# Patient Record
Sex: MALE | Race: WHITE | NOT HISPANIC OR LATINO | Employment: FULL TIME | URBAN - METROPOLITAN AREA
[De-identification: names, ages, dates, MRNs, and addresses within clinical notes are randomized per-mention and may not be internally consistent; named-entity substitution may affect disease eponyms.]

---

## 2017-05-31 ENCOUNTER — COMMUNICATION - HEALTHEAST (OUTPATIENT)
Dept: SCHEDULING | Facility: CLINIC | Age: 57
End: 2017-05-31

## 2017-05-31 ENCOUNTER — OFFICE VISIT - HEALTHEAST (OUTPATIENT)
Dept: FAMILY MEDICINE | Facility: CLINIC | Age: 57
End: 2017-05-31

## 2017-05-31 DIAGNOSIS — S61.011A THUMB LACERATION, RIGHT, INITIAL ENCOUNTER: ICD-10-CM

## 2017-07-05 ENCOUNTER — OFFICE VISIT - HEALTHEAST (OUTPATIENT)
Dept: FAMILY MEDICINE | Facility: CLINIC | Age: 57
End: 2017-07-05

## 2017-07-05 DIAGNOSIS — K42.9 UMBILICAL HERNIA WITHOUT OBSTRUCTION AND WITHOUT GANGRENE: ICD-10-CM

## 2017-07-05 DIAGNOSIS — K40.21: ICD-10-CM

## 2017-07-05 DIAGNOSIS — E66.09 NON MORBID OBESITY DUE TO EXCESS CALORIES: ICD-10-CM

## 2017-07-05 DIAGNOSIS — F30.9 BIPOLAR I DISORDER, SINGLE MANIC EPISODE (H): ICD-10-CM

## 2017-07-05 DIAGNOSIS — M1A.00X0 CHRONIC GOUTY ARTHROPATHY: ICD-10-CM

## 2017-07-05 DIAGNOSIS — Z00.00 ROUTINE GENERAL MEDICAL EXAMINATION AT A HEALTH CARE FACILITY: ICD-10-CM

## 2017-07-05 DIAGNOSIS — E78.00 HYPERCHOLESTEROLEMIA: ICD-10-CM

## 2017-07-05 DIAGNOSIS — Z91.09 OTHER ALLERGY, OTHER THAN TO MEDICINAL AGENTS: ICD-10-CM

## 2017-07-05 LAB
CHOLEST SERPL-MCNC: 256 MG/DL
FASTING STATUS PATIENT QL REPORTED: YES
HDLC SERPL-MCNC: 43 MG/DL
LDLC SERPL CALC-MCNC: 182 MG/DL
TRIGL SERPL-MCNC: 155 MG/DL

## 2017-07-05 ASSESSMENT — MIFFLIN-ST. JEOR: SCORE: 1766.08

## 2017-07-26 ENCOUNTER — COMMUNICATION - HEALTHEAST (OUTPATIENT)
Dept: FAMILY MEDICINE | Facility: CLINIC | Age: 57
End: 2017-07-26

## 2017-07-26 DIAGNOSIS — D23.9 DYSPLASTIC NEVI: ICD-10-CM

## 2017-08-01 ENCOUNTER — OFFICE VISIT - HEALTHEAST (OUTPATIENT)
Dept: RHEUMATOLOGY | Facility: CLINIC | Age: 57
End: 2017-08-01

## 2017-08-01 DIAGNOSIS — M1A.00X0 CHRONIC GOUTY ARTHROPATHY: ICD-10-CM

## 2017-08-01 ASSESSMENT — MIFFLIN-ST. JEOR: SCORE: 1798.74

## 2018-01-31 ENCOUNTER — COMMUNICATION - HEALTHEAST (OUTPATIENT)
Dept: RHEUMATOLOGY | Facility: CLINIC | Age: 58
End: 2018-01-31

## 2018-01-31 DIAGNOSIS — M1A.00X0 CHRONIC GOUTY ARTHROPATHY: ICD-10-CM

## 2018-03-26 ENCOUNTER — OFFICE VISIT - HEALTHEAST (OUTPATIENT)
Dept: RHEUMATOLOGY | Facility: CLINIC | Age: 58
End: 2018-03-26

## 2018-03-26 DIAGNOSIS — M10.021 ACUTE IDIOPATHIC GOUT OF RIGHT ELBOW: ICD-10-CM

## 2018-03-26 DIAGNOSIS — M1A.00X0 CHRONIC GOUTY ARTHROPATHY: ICD-10-CM

## 2018-03-26 ASSESSMENT — MIFFLIN-ST. JEOR: SCORE: 1798.74

## 2018-04-23 ENCOUNTER — RECORDS - HEALTHEAST (OUTPATIENT)
Dept: GENERAL RADIOLOGY | Facility: CLINIC | Age: 58
End: 2018-04-23

## 2018-04-23 ENCOUNTER — COMMUNICATION - HEALTHEAST (OUTPATIENT)
Dept: ADMINISTRATIVE | Facility: CLINIC | Age: 58
End: 2018-04-23

## 2018-04-23 ENCOUNTER — OFFICE VISIT - HEALTHEAST (OUTPATIENT)
Dept: FAMILY MEDICINE | Facility: CLINIC | Age: 58
End: 2018-04-23

## 2018-04-23 DIAGNOSIS — M1A.00X0 CHRONIC GOUTY ARTHROPATHY: ICD-10-CM

## 2018-04-23 DIAGNOSIS — M25.521 PAIN IN RIGHT ELBOW: ICD-10-CM

## 2018-04-23 DIAGNOSIS — M25.521 RIGHT ELBOW PAIN: ICD-10-CM

## 2018-04-23 ASSESSMENT — MIFFLIN-ST. JEOR: SCORE: 1854.08

## 2018-04-27 ENCOUNTER — OFFICE VISIT - HEALTHEAST (OUTPATIENT)
Dept: RHEUMATOLOGY | Facility: CLINIC | Age: 58
End: 2018-04-27

## 2018-04-27 DIAGNOSIS — M10.021 ACUTE IDIOPATHIC GOUT OF RIGHT ELBOW: ICD-10-CM

## 2018-04-27 LAB — CRYSTALS SNV MICRO: NORMAL

## 2018-05-04 ENCOUNTER — COMMUNICATION - HEALTHEAST (OUTPATIENT)
Dept: ADMINISTRATIVE | Facility: CLINIC | Age: 58
End: 2018-05-04

## 2018-06-28 ENCOUNTER — OFFICE VISIT - HEALTHEAST (OUTPATIENT)
Dept: RHEUMATOLOGY | Facility: CLINIC | Age: 58
End: 2018-06-28

## 2018-06-28 DIAGNOSIS — M10.021 ACUTE IDIOPATHIC GOUT OF RIGHT ELBOW: ICD-10-CM

## 2018-08-05 ENCOUNTER — COMMUNICATION - HEALTHEAST (OUTPATIENT)
Dept: RHEUMATOLOGY | Facility: CLINIC | Age: 58
End: 2018-08-05

## 2018-08-09 ENCOUNTER — AMBULATORY - HEALTHEAST (OUTPATIENT)
Dept: RHEUMATOLOGY | Facility: CLINIC | Age: 58
End: 2018-08-09

## 2018-08-09 DIAGNOSIS — M10.021 ACUTE IDIOPATHIC GOUT OF RIGHT ELBOW: ICD-10-CM

## 2018-08-24 ENCOUNTER — AMBULATORY - HEALTHEAST (OUTPATIENT)
Dept: LAB | Facility: CLINIC | Age: 58
End: 2018-08-24

## 2018-08-24 DIAGNOSIS — M10.021 ACUTE IDIOPATHIC GOUT OF RIGHT ELBOW: ICD-10-CM

## 2018-08-24 LAB
ALBUMIN SERPL-MCNC: 4.5 G/DL (ref 3.5–5)
ALT SERPL W P-5'-P-CCNC: 45 U/L (ref 0–45)
CREAT SERPL-MCNC: 0.78 MG/DL (ref 0.7–1.3)
ERYTHROCYTE [DISTWIDTH] IN BLOOD BY AUTOMATED COUNT: 11 % (ref 11–14.5)
GFR SERPL CREATININE-BSD FRML MDRD: >60 ML/MIN/1.73M2
HCT VFR BLD AUTO: 48.4 % (ref 40–54)
HGB BLD-MCNC: 16.6 G/DL (ref 14–18)
MCH RBC QN AUTO: 31.4 PG (ref 27–34)
MCHC RBC AUTO-ENTMCNC: 34.4 G/DL (ref 32–36)
MCV RBC AUTO: 91 FL (ref 80–100)
PLATELET # BLD AUTO: 243 THOU/UL (ref 140–440)
PMV BLD AUTO: 8.1 FL (ref 7–10)
RBC # BLD AUTO: 5.29 MILL/UL (ref 4.4–6.2)
URATE SERPL-MCNC: 6.9 MG/DL (ref 3–8)
WBC: 7 THOU/UL (ref 4–11)

## 2018-08-28 ENCOUNTER — OFFICE VISIT - HEALTHEAST (OUTPATIENT)
Dept: RHEUMATOLOGY | Facility: CLINIC | Age: 58
End: 2018-08-28

## 2018-08-28 DIAGNOSIS — M1A.00X0 CHRONIC GOUTY ARTHROPATHY: ICD-10-CM

## 2018-11-09 ENCOUNTER — COMMUNICATION - HEALTHEAST (OUTPATIENT)
Dept: LAB | Facility: CLINIC | Age: 58
End: 2018-11-09

## 2018-11-09 DIAGNOSIS — M1A.00X0 CHRONIC GOUTY ARTHROPATHY: ICD-10-CM

## 2018-11-23 ENCOUNTER — AMBULATORY - HEALTHEAST (OUTPATIENT)
Dept: LAB | Facility: CLINIC | Age: 58
End: 2018-11-23

## 2018-11-23 DIAGNOSIS — M1A.00X0 CHRONIC GOUTY ARTHROPATHY: ICD-10-CM

## 2018-11-23 LAB
ALBUMIN SERPL-MCNC: 4 G/DL (ref 3.5–5)
ALT SERPL W P-5'-P-CCNC: 64 U/L (ref 0–45)
CREAT SERPL-MCNC: 0.74 MG/DL (ref 0.7–1.3)
ERYTHROCYTE [DISTWIDTH] IN BLOOD BY AUTOMATED COUNT: 11.6 % (ref 11–14.5)
GFR SERPL CREATININE-BSD FRML MDRD: >60 ML/MIN/1.73M2
HCT VFR BLD AUTO: 45.7 % (ref 40–54)
HGB BLD-MCNC: 16.1 G/DL (ref 14–18)
MCH RBC QN AUTO: 32.4 PG (ref 27–34)
MCHC RBC AUTO-ENTMCNC: 35.3 G/DL (ref 32–36)
MCV RBC AUTO: 92 FL (ref 80–100)
PLATELET # BLD AUTO: 227 THOU/UL (ref 140–440)
PMV BLD AUTO: 7.9 FL (ref 7–10)
RBC # BLD AUTO: 4.97 MILL/UL (ref 4.4–6.2)
URATE SERPL-MCNC: 5.1 MG/DL (ref 3–8)
WBC: 8.8 THOU/UL (ref 4–11)

## 2018-11-27 ENCOUNTER — OFFICE VISIT - HEALTHEAST (OUTPATIENT)
Dept: RHEUMATOLOGY | Facility: CLINIC | Age: 58
End: 2018-11-27

## 2018-11-27 DIAGNOSIS — M1A.00X0 CHRONIC GOUTY ARTHROPATHY: ICD-10-CM

## 2018-11-27 DIAGNOSIS — R74.01 ALT (SGPT) LEVEL RAISED: ICD-10-CM

## 2018-12-13 ENCOUNTER — COMMUNICATION - HEALTHEAST (OUTPATIENT)
Dept: LAB | Facility: CLINIC | Age: 58
End: 2018-12-13

## 2018-12-13 DIAGNOSIS — R74.01 ELEVATED ALT MEASUREMENT: ICD-10-CM

## 2018-12-13 DIAGNOSIS — M1A.00X0 CHRONIC GOUTY ARTHROPATHY: ICD-10-CM

## 2018-12-27 ENCOUNTER — AMBULATORY - HEALTHEAST (OUTPATIENT)
Dept: LAB | Facility: CLINIC | Age: 58
End: 2018-12-27

## 2018-12-27 DIAGNOSIS — R74.01 ELEVATED ALT MEASUREMENT: ICD-10-CM

## 2018-12-27 LAB — ALT SERPL W P-5'-P-CCNC: 64 U/L (ref 0–45)

## 2019-02-08 ENCOUNTER — AMBULATORY - HEALTHEAST (OUTPATIENT)
Dept: RHEUMATOLOGY | Facility: CLINIC | Age: 59
End: 2019-02-08

## 2019-02-08 DIAGNOSIS — M10.021 ACUTE IDIOPATHIC GOUT OF RIGHT ELBOW: ICD-10-CM

## 2019-02-08 DIAGNOSIS — M1A.00X0 CHRONIC GOUTY ARTHROPATHY: ICD-10-CM

## 2019-02-27 ENCOUNTER — AMBULATORY - HEALTHEAST (OUTPATIENT)
Dept: LAB | Facility: CLINIC | Age: 59
End: 2019-02-27

## 2019-02-27 DIAGNOSIS — M1A.00X0 CHRONIC GOUTY ARTHROPATHY: ICD-10-CM

## 2019-02-27 LAB
ALBUMIN SERPL-MCNC: 4.1 G/DL (ref 3.5–5)
ALT SERPL W P-5'-P-CCNC: 46 U/L (ref 0–45)
CREAT SERPL-MCNC: 0.84 MG/DL (ref 0.7–1.3)
ERYTHROCYTE [DISTWIDTH] IN BLOOD BY AUTOMATED COUNT: 11.9 % (ref 11–14.5)
GFR SERPL CREATININE-BSD FRML MDRD: >60 ML/MIN/1.73M2
HCT VFR BLD AUTO: 46.8 % (ref 40–54)
HGB BLD-MCNC: 15.9 G/DL (ref 14–18)
MCH RBC QN AUTO: 31.1 PG (ref 27–34)
MCHC RBC AUTO-ENTMCNC: 33.8 G/DL (ref 32–36)
MCV RBC AUTO: 92 FL (ref 80–100)
PLATELET # BLD AUTO: 223 THOU/UL (ref 140–440)
PMV BLD AUTO: 8.1 FL (ref 7–10)
RBC # BLD AUTO: 5.1 MILL/UL (ref 4.4–6.2)
URATE SERPL-MCNC: 6.1 MG/DL (ref 3–8)
WBC: 6.2 THOU/UL (ref 4–11)

## 2019-03-12 ENCOUNTER — OFFICE VISIT - HEALTHEAST (OUTPATIENT)
Dept: RHEUMATOLOGY | Facility: CLINIC | Age: 59
End: 2019-03-12

## 2019-03-12 DIAGNOSIS — M1A.00X0 CHRONIC GOUTY ARTHROPATHY: ICD-10-CM

## 2019-03-12 DIAGNOSIS — R74.01 ALT (SGPT) LEVEL RAISED: ICD-10-CM

## 2019-03-18 ENCOUNTER — COMMUNICATION - HEALTHEAST (OUTPATIENT)
Dept: RHEUMATOLOGY | Facility: CLINIC | Age: 59
End: 2019-03-18

## 2019-03-18 DIAGNOSIS — M1A.00X0 CHRONIC GOUTY ARTHROPATHY: ICD-10-CM

## 2019-05-31 ENCOUNTER — COMMUNICATION - HEALTHEAST (OUTPATIENT)
Dept: LAB | Facility: CLINIC | Age: 59
End: 2019-05-31

## 2019-05-31 DIAGNOSIS — M1A.00X0 CHRONIC GOUTY ARTHROPATHY: ICD-10-CM

## 2019-06-12 ENCOUNTER — AMBULATORY - HEALTHEAST (OUTPATIENT)
Dept: LAB | Facility: CLINIC | Age: 59
End: 2019-06-12

## 2019-06-12 DIAGNOSIS — M1A.00X0 CHRONIC GOUTY ARTHROPATHY: ICD-10-CM

## 2019-06-12 LAB
ALBUMIN SERPL-MCNC: 3.8 G/DL (ref 3.5–5)
ALT SERPL W P-5'-P-CCNC: 34 U/L (ref 0–45)
CREAT SERPL-MCNC: 0.8 MG/DL (ref 0.7–1.3)
ERYTHROCYTE [DISTWIDTH] IN BLOOD BY AUTOMATED COUNT: 12.1 % (ref 11–14.5)
GFR SERPL CREATININE-BSD FRML MDRD: >60 ML/MIN/1.73M2
HCT VFR BLD AUTO: 45.6 % (ref 40–54)
HGB BLD-MCNC: 15.1 G/DL (ref 14–18)
MCH RBC QN AUTO: 30.4 PG (ref 27–34)
MCHC RBC AUTO-ENTMCNC: 33.1 G/DL (ref 32–36)
MCV RBC AUTO: 92 FL (ref 80–100)
PLATELET # BLD AUTO: 218 THOU/UL (ref 140–440)
PMV BLD AUTO: 8.2 FL (ref 7–10)
RBC # BLD AUTO: 4.97 MILL/UL (ref 4.4–6.2)
URATE SERPL-MCNC: 5.3 MG/DL (ref 3–8)
WBC: 4.8 THOU/UL (ref 4–11)

## 2019-07-05 ENCOUNTER — COMMUNICATION - HEALTHEAST (OUTPATIENT)
Dept: RHEUMATOLOGY | Facility: CLINIC | Age: 59
End: 2019-07-05

## 2019-07-05 DIAGNOSIS — M1A.00X0 CHRONIC GOUTY ARTHROPATHY: ICD-10-CM

## 2019-07-24 ENCOUNTER — COMMUNICATION - HEALTHEAST (OUTPATIENT)
Dept: RHEUMATOLOGY | Facility: CLINIC | Age: 59
End: 2019-07-24

## 2019-07-24 DIAGNOSIS — M1A.00X0 CHRONIC GOUTY ARTHROPATHY: ICD-10-CM

## 2019-08-01 ENCOUNTER — RECORDS - HEALTHEAST (OUTPATIENT)
Dept: ADMINISTRATIVE | Facility: OTHER | Age: 59
End: 2019-08-01

## 2019-08-09 ENCOUNTER — COMMUNICATION - HEALTHEAST (OUTPATIENT)
Dept: RHEUMATOLOGY | Facility: CLINIC | Age: 59
End: 2019-08-09

## 2019-08-09 DIAGNOSIS — M1A.00X0 CHRONIC GOUTY ARTHROPATHY: ICD-10-CM

## 2019-09-09 ENCOUNTER — AMBULATORY - HEALTHEAST (OUTPATIENT)
Dept: LAB | Facility: CLINIC | Age: 59
End: 2019-09-09

## 2019-09-09 DIAGNOSIS — M1A.00X0 CHRONIC GOUTY ARTHROPATHY: ICD-10-CM

## 2019-09-09 LAB
ALBUMIN SERPL-MCNC: 4.2 G/DL (ref 3.5–5)
ALT SERPL W P-5'-P-CCNC: 43 U/L (ref 0–45)
CREAT SERPL-MCNC: 0.86 MG/DL (ref 0.7–1.3)
ERYTHROCYTE [DISTWIDTH] IN BLOOD BY AUTOMATED COUNT: 12 % (ref 11–14.5)
GFR SERPL CREATININE-BSD FRML MDRD: >60 ML/MIN/1.73M2
HCT VFR BLD AUTO: 45 % (ref 40–54)
HGB BLD-MCNC: 15.6 G/DL (ref 14–18)
MCH RBC QN AUTO: 31.5 PG (ref 27–34)
MCHC RBC AUTO-ENTMCNC: 34.6 G/DL (ref 32–36)
MCV RBC AUTO: 91 FL (ref 80–100)
PLATELET # BLD AUTO: 237 THOU/UL (ref 140–440)
PMV BLD AUTO: 7.7 FL (ref 7–10)
RBC # BLD AUTO: 4.94 MILL/UL (ref 4.4–6.2)
URATE SERPL-MCNC: 5.8 MG/DL (ref 3–8)
WBC: 6.2 THOU/UL (ref 4–11)

## 2019-09-12 ENCOUNTER — OFFICE VISIT - HEALTHEAST (OUTPATIENT)
Dept: RHEUMATOLOGY | Facility: CLINIC | Age: 59
End: 2019-09-12

## 2019-09-12 DIAGNOSIS — M1A.00X0 CHRONIC GOUTY ARTHROPATHY: ICD-10-CM

## 2019-09-12 DIAGNOSIS — M79.672 INFLAMMATORY HEEL PAIN, LEFT: ICD-10-CM

## 2019-12-03 ENCOUNTER — COMMUNICATION - HEALTHEAST (OUTPATIENT)
Dept: RHEUMATOLOGY | Facility: CLINIC | Age: 59
End: 2019-12-03

## 2019-12-16 ENCOUNTER — AMBULATORY - HEALTHEAST (OUTPATIENT)
Dept: LAB | Facility: CLINIC | Age: 59
End: 2019-12-16

## 2019-12-16 DIAGNOSIS — M1A.00X0 CHRONIC GOUTY ARTHROPATHY: ICD-10-CM

## 2019-12-16 LAB
ALT SERPL W P-5'-P-CCNC: 44 U/L (ref 0–45)
CREAT SERPL-MCNC: 0.82 MG/DL (ref 0.7–1.3)
ERYTHROCYTE [DISTWIDTH] IN BLOOD BY AUTOMATED COUNT: 11.8 % (ref 11–14.5)
GFR SERPL CREATININE-BSD FRML MDRD: >60 ML/MIN/1.73M2
HCT VFR BLD AUTO: 50.3 % (ref 40–54)
HGB BLD-MCNC: 16.4 G/DL (ref 14–18)
MCH RBC QN AUTO: 30.1 PG (ref 27–34)
MCHC RBC AUTO-ENTMCNC: 32.5 G/DL (ref 32–36)
MCV RBC AUTO: 93 FL (ref 80–100)
PLATELET # BLD AUTO: 232 THOU/UL (ref 140–440)
PMV BLD AUTO: 7.9 FL (ref 7–10)
RBC # BLD AUTO: 5.43 MILL/UL (ref 4.4–6.2)
URATE SERPL-MCNC: 4.5 MG/DL (ref 3–8)
WBC: 6.2 THOU/UL (ref 4–11)

## 2020-01-29 ENCOUNTER — OFFICE VISIT - HEALTHEAST (OUTPATIENT)
Dept: FAMILY MEDICINE | Facility: CLINIC | Age: 60
End: 2020-01-29

## 2020-01-29 DIAGNOSIS — F30.9 BIPOLAR I DISORDER, SINGLE MANIC EPISODE (H): ICD-10-CM

## 2020-01-29 DIAGNOSIS — K42.9 UMBILICAL HERNIA WITHOUT OBSTRUCTION AND WITHOUT GANGRENE: ICD-10-CM

## 2020-01-29 DIAGNOSIS — R73.01 IMPAIRED FASTING GLUCOSE: ICD-10-CM

## 2020-01-29 DIAGNOSIS — K40.20 NON-RECURRENT BILATERAL INGUINAL HERNIA WITHOUT OBSTRUCTION OR GANGRENE: ICD-10-CM

## 2020-01-29 DIAGNOSIS — E66.09 CLASS 2 OBESITY DUE TO EXCESS CALORIES WITHOUT SERIOUS COMORBIDITY WITH BODY MASS INDEX (BMI) OF 36.0 TO 36.9 IN ADULT: ICD-10-CM

## 2020-01-29 DIAGNOSIS — R74.01 ALT (SGPT) LEVEL RAISED: ICD-10-CM

## 2020-01-29 DIAGNOSIS — Z00.00 ROUTINE GENERAL MEDICAL EXAMINATION AT A HEALTH CARE FACILITY: ICD-10-CM

## 2020-01-29 DIAGNOSIS — E66.812 CLASS 2 OBESITY DUE TO EXCESS CALORIES WITHOUT SERIOUS COMORBIDITY WITH BODY MASS INDEX (BMI) OF 36.0 TO 36.9 IN ADULT: ICD-10-CM

## 2020-01-29 DIAGNOSIS — M1A.00X0 CHRONIC GOUTY ARTHROPATHY: ICD-10-CM

## 2020-01-29 DIAGNOSIS — E78.00 HYPERCHOLESTEROLEMIA: ICD-10-CM

## 2020-01-29 ASSESSMENT — MIFFLIN-ST. JEOR: SCORE: 1909.41

## 2020-03-09 ENCOUNTER — AMBULATORY - HEALTHEAST (OUTPATIENT)
Dept: LAB | Facility: CLINIC | Age: 60
End: 2020-03-09

## 2020-03-09 DIAGNOSIS — R73.01 IMPAIRED FASTING GLUCOSE: ICD-10-CM

## 2020-03-09 DIAGNOSIS — Z00.00 ROUTINE GENERAL MEDICAL EXAMINATION AT A HEALTH CARE FACILITY: ICD-10-CM

## 2020-03-09 DIAGNOSIS — M1A.00X0 CHRONIC GOUTY ARTHROPATHY: ICD-10-CM

## 2020-03-09 DIAGNOSIS — E78.00 HYPERCHOLESTEROLEMIA: ICD-10-CM

## 2020-03-09 DIAGNOSIS — R74.01 ALT (SGPT) LEVEL RAISED: ICD-10-CM

## 2020-03-09 LAB
ALBUMIN SERPL-MCNC: 3.8 G/DL (ref 3.5–5)
ALP SERPL-CCNC: 79 U/L (ref 45–120)
ALT SERPL W P-5'-P-CCNC: 37 U/L (ref 0–45)
ANION GAP SERPL CALCULATED.3IONS-SCNC: 11 MMOL/L (ref 5–18)
AST SERPL W P-5'-P-CCNC: 19 U/L (ref 0–40)
BILIRUB SERPL-MCNC: 0.6 MG/DL (ref 0–1)
BUN SERPL-MCNC: 13 MG/DL (ref 8–22)
CALCIUM SERPL-MCNC: 9.5 MG/DL (ref 8.5–10.5)
CHLORIDE BLD-SCNC: 103 MMOL/L (ref 98–107)
CHOLEST SERPL-MCNC: 244 MG/DL
CO2 SERPL-SCNC: 22 MMOL/L (ref 22–31)
CREAT SERPL-MCNC: 0.77 MG/DL (ref 0.7–1.3)
ERYTHROCYTE [DISTWIDTH] IN BLOOD BY AUTOMATED COUNT: 11.8 % (ref 11–14.5)
FASTING STATUS PATIENT QL REPORTED: YES
GFR SERPL CREATININE-BSD FRML MDRD: >60 ML/MIN/1.73M2
GLUCOSE BLD-MCNC: 115 MG/DL (ref 70–125)
HBA1C MFR BLD: 5.5 % (ref 3.5–6)
HCT VFR BLD AUTO: 44.5 % (ref 40–54)
HDLC SERPL-MCNC: 38 MG/DL
HGB BLD-MCNC: 15.2 G/DL (ref 14–18)
HIV 1+2 AB+HIV1 P24 AG SERPL QL IA: NEGATIVE
LDLC SERPL CALC-MCNC: 152 MG/DL
MCH RBC QN AUTO: 31.1 PG (ref 27–34)
MCHC RBC AUTO-ENTMCNC: 34.2 G/DL (ref 32–36)
MCV RBC AUTO: 91 FL (ref 80–100)
PLATELET # BLD AUTO: 209 THOU/UL (ref 140–440)
PMV BLD AUTO: 7.7 FL (ref 7–10)
POTASSIUM BLD-SCNC: 4.4 MMOL/L (ref 3.5–5)
PROT SERPL-MCNC: 6.8 G/DL (ref 6–8)
PSA SERPL-MCNC: 1.4 NG/ML (ref 0–4.5)
RBC # BLD AUTO: 4.89 MILL/UL (ref 4.4–6.2)
SODIUM SERPL-SCNC: 136 MMOL/L (ref 136–145)
TRIGL SERPL-MCNC: 269 MG/DL
URATE SERPL-MCNC: 4.8 MG/DL (ref 3–8)
WBC: 6.1 THOU/UL (ref 4–11)

## 2020-03-10 LAB — HCV AB SERPL QL IA: NEGATIVE

## 2020-03-12 ENCOUNTER — OFFICE VISIT - HEALTHEAST (OUTPATIENT)
Dept: RHEUMATOLOGY | Facility: CLINIC | Age: 60
End: 2020-03-12

## 2020-03-12 DIAGNOSIS — M1A.00X0 CHRONIC GOUTY ARTHROPATHY: ICD-10-CM

## 2020-09-16 ENCOUNTER — COMMUNICATION - HEALTHEAST (OUTPATIENT)
Dept: LAB | Facility: CLINIC | Age: 60
End: 2020-09-16

## 2020-09-16 DIAGNOSIS — M1A.00X0 CHRONIC GOUTY ARTHROPATHY: ICD-10-CM

## 2020-09-17 ENCOUNTER — AMBULATORY - HEALTHEAST (OUTPATIENT)
Dept: LAB | Facility: CLINIC | Age: 60
End: 2020-09-17

## 2020-09-17 DIAGNOSIS — M1A.00X0 CHRONIC GOUTY ARTHROPATHY: ICD-10-CM

## 2020-09-17 LAB
ALBUMIN SERPL-MCNC: 4.5 G/DL (ref 3.5–5)
ALT SERPL W P-5'-P-CCNC: 59 U/L (ref 0–45)
CREAT SERPL-MCNC: 0.82 MG/DL (ref 0.7–1.3)
ERYTHROCYTE [DISTWIDTH] IN BLOOD BY AUTOMATED COUNT: 12 % (ref 11–14.5)
GFR SERPL CREATININE-BSD FRML MDRD: >60 ML/MIN/1.73M2
HCT VFR BLD AUTO: 50.3 % (ref 40–54)
HGB BLD-MCNC: 17.2 G/DL (ref 14–18)
MCH RBC QN AUTO: 31.6 PG (ref 27–34)
MCHC RBC AUTO-ENTMCNC: 34.2 G/DL (ref 32–36)
MCV RBC AUTO: 92 FL (ref 80–100)
PLATELET # BLD AUTO: 250 THOU/UL (ref 140–440)
PMV BLD AUTO: 8.2 FL (ref 7–10)
RBC # BLD AUTO: 5.44 MILL/UL (ref 4.4–6.2)
URATE SERPL-MCNC: 6.3 MG/DL (ref 3–8)
WBC: 9.4 THOU/UL (ref 4–11)

## 2020-11-10 ENCOUNTER — COMMUNICATION - HEALTHEAST (OUTPATIENT)
Dept: RHEUMATOLOGY | Facility: CLINIC | Age: 60
End: 2020-11-10

## 2020-11-10 DIAGNOSIS — M1A.00X0 CHRONIC GOUTY ARTHROPATHY: ICD-10-CM

## 2021-03-04 ENCOUNTER — COMMUNICATION - HEALTHEAST (OUTPATIENT)
Dept: LAB | Facility: CLINIC | Age: 61
End: 2021-03-04

## 2021-03-04 DIAGNOSIS — M1A.00X0 CHRONIC GOUTY ARTHROPATHY: ICD-10-CM

## 2021-03-08 ENCOUNTER — AMBULATORY - HEALTHEAST (OUTPATIENT)
Dept: LAB | Facility: CLINIC | Age: 61
End: 2021-03-08

## 2021-03-08 DIAGNOSIS — M1A.00X0 CHRONIC GOUTY ARTHROPATHY: ICD-10-CM

## 2021-03-08 LAB
ALBUMIN SERPL-MCNC: 3.9 G/DL (ref 3.5–5)
ALT SERPL W P-5'-P-CCNC: 36 U/L (ref 0–45)
CREAT SERPL-MCNC: 0.8 MG/DL (ref 0.7–1.3)
ERYTHROCYTE [DISTWIDTH] IN BLOOD BY AUTOMATED COUNT: 13.6 % (ref 11–14.5)
GFR SERPL CREATININE-BSD FRML MDRD: >60 ML/MIN/1.73M2
HCT VFR BLD AUTO: 43.8 % (ref 40–54)
HGB BLD-MCNC: 15 G/DL (ref 14–18)
MCH RBC QN AUTO: 30.5 PG (ref 27–34)
MCHC RBC AUTO-ENTMCNC: 34.2 G/DL (ref 32–36)
MCV RBC AUTO: 89 FL (ref 80–100)
PLATELET # BLD AUTO: 249 THOU/UL (ref 140–440)
PMV BLD AUTO: 10.9 FL (ref 7–10)
RBC # BLD AUTO: 4.91 MILL/UL (ref 4.4–6.2)
URATE SERPL-MCNC: 4.4 MG/DL (ref 3–8)
WBC: 6.8 THOU/UL (ref 4–11)

## 2021-03-11 ENCOUNTER — OFFICE VISIT - HEALTHEAST (OUTPATIENT)
Dept: RHEUMATOLOGY | Facility: CLINIC | Age: 61
End: 2021-03-11

## 2021-03-11 ENCOUNTER — AMBULATORY - HEALTHEAST (OUTPATIENT)
Dept: RHEUMATOLOGY | Facility: CLINIC | Age: 61
End: 2021-03-11

## 2021-03-11 DIAGNOSIS — M1A.00X0 CHRONIC GOUTY ARTHROPATHY: ICD-10-CM

## 2021-03-11 DIAGNOSIS — E66.812 CLASS 2 OBESITY DUE TO EXCESS CALORIES WITHOUT SERIOUS COMORBIDITY IN ADULT, UNSPECIFIED BMI: ICD-10-CM

## 2021-03-11 DIAGNOSIS — E66.09 CLASS 2 OBESITY DUE TO EXCESS CALORIES WITHOUT SERIOUS COMORBIDITY IN ADULT, UNSPECIFIED BMI: ICD-10-CM

## 2021-03-11 DIAGNOSIS — G89.29 CHRONIC LEFT SHOULDER PAIN: ICD-10-CM

## 2021-03-11 DIAGNOSIS — M25.512 CHRONIC LEFT SHOULDER PAIN: ICD-10-CM

## 2021-03-11 DIAGNOSIS — M15.0 PRIMARY OSTEOARTHRITIS INVOLVING MULTIPLE JOINTS: ICD-10-CM

## 2021-05-26 ENCOUNTER — RECORDS - HEALTHEAST (OUTPATIENT)
Dept: ADMINISTRATIVE | Facility: CLINIC | Age: 61
End: 2021-05-26

## 2021-05-29 NOTE — TELEPHONE ENCOUNTER
Patient has a lab appt.  No orders available.  Please review and place orders needed.  Thank you  Lab

## 2021-05-30 NOTE — TELEPHONE ENCOUNTER
Violet    Please refill: predniSONE (DELTASONE) 20 MG tablet    Please send to: [facility] - [place], [place] - [address] AT [address]     Patient is having a gout attack and needs a refill. Please send a refill.    Any questions or concerns, he can be reached @ [phone].

## 2021-05-31 VITALS — WEIGHT: 214 LBS | BODY MASS INDEX: 31.7 KG/M2 | HEIGHT: 69 IN

## 2021-05-31 VITALS — HEIGHT: 69 IN | BODY MASS INDEX: 32.76 KG/M2 | WEIGHT: 221.2 LBS

## 2021-05-31 VITALS — WEIGHT: 225.2 LBS | BODY MASS INDEX: 33.5 KG/M2

## 2021-05-31 NOTE — TELEPHONE ENCOUNTER
Refilled per Dr Lazo, pt has appt on 9/12, pt to keep that appt to discuss gout and treatment. Pt notified this was done and states he is starting to have a flare up now and would like to start taking prednisone.

## 2021-05-31 NOTE — TELEPHONE ENCOUNTER
Patient calling back again. He is still waiting for the approval. He is starting to have a flare up and would like to this medication refill asap.     Patient would appreciate a call today.     Low @ [phone]

## 2021-05-31 NOTE — TELEPHONE ENCOUNTER
Patient has a lab only appointment and a f/u with Dr. Lazo scheduled in September.   He is going on vacation for 2 weeks and would like to have the prednisone incase he has a flare up.     Low @ [phone]

## 2021-06-01 VITALS — WEIGHT: 232 LBS | BODY MASS INDEX: 34.26 KG/M2

## 2021-06-01 VITALS — WEIGHT: 229 LBS | BODY MASS INDEX: 33.82 KG/M2

## 2021-06-01 VITALS — WEIGHT: 221.2 LBS | HEIGHT: 69 IN | BODY MASS INDEX: 32.76 KG/M2

## 2021-06-01 VITALS — BODY MASS INDEX: 34.57 KG/M2 | HEIGHT: 69 IN | WEIGHT: 233.4 LBS

## 2021-06-01 VITALS — BODY MASS INDEX: 35.44 KG/M2 | WEIGHT: 240 LBS

## 2021-06-01 NOTE — PROGRESS NOTES
ASSESSMENT AND PLAN Low Waller 59 y.o. male is here for follow-up.  He has crystal proven gout.  He recently had a serum urate 5.8, this is on 450 mg of allopurinol.  His liver function studies are not normal.  He is going to increase allopurinol to 600 mg daily.  He is aware of the target to be at 5 or below.  He has not had a full-blown episode of gout although occasional minor episodes of discomfort short-lived of different quality of pain, definitely decreasing in frequency and severity.  Will meet here in 6 months.  Labs in 3 months and just prior.                Diagnoses and all orders for this visit:    Chronic Gout  -     allopurinol (ZYLOPRIM) 300 MG tablet; Take 2 tablets (600 mg total) by mouth daily.  Dispense: 180 tablet; Refill: 1  -     Uric Acid; Standing  -     HM2(CBC w/o Differential); Standing  -     Creatinine; Standing  -     ALT (SGPT); Standing    Inflammatory heel pain, left             HISTORY OF PRESENTING ILLNESS:  Low Waller 59 y.o. is here for follow-up of gout, crystal proven.  He has tolerated allopurinol at 450 mg daily..  He is no longer on colchicine.  He noted short-lived episode of pain.  This was in the left heel.  This responded to corticosteroids orally.  The pain was not of a pricking sharp more dull in nature.  He has not had recurrence of the right elbow symptoms.  He noted his pain level to be 0/10.  He has not had a flareup.  His serum urate is 5.8.  To be better than that.  It was lower.  He has not had any change in his diet, weight, and is more exercising now.  Takes allopurinol regularly on almost all days.  Recent labs reviewed.  ALT is no longer elevated.  .   He is trying to lose weight and his niece and himself are joined the [facility] where he is headed now after this visit.  He has not had fever or weight loss blurry vision rash cough nausea mouth ulcer or eye redness.     His gout typically affects the lower extremities especially feet 1 of the  other side.  He reports no history of trauma.  In the past he has opted to treat gout on as-needed basis..Original episode affected the left first MTP joint. his joint stiffness is ongoing and his joint swelling is improved.  Limitation on activities include none. The patient is not avoiding high purine foods. Alcoholic as noted. Limitation on activities as noted in the MDHAQ scanned in the EMR.  Further historical information, including ROS as noted in the multidimensional health assessment questionnaire scanned in the EMR and in the assessment and plan section.  No other joint areas and upper extremities and chest, neck or back affected.  He has not observed any tophi.       ALLERGIES:Demerol [meperidine] and Venom-honey bee    PAST MEDICAL/ACTIVE PROBLEMS/MEDICATION/SOCIAL DATA  Past Medical History:   Diagnosis Date     Depression      Social History     Tobacco Use   Smoking Status Former Smoker     Last attempt to quit: 1995     Years since quittin.6   Smokeless Tobacco Never Used     Patient Active Problem List   Diagnosis     Rosacea     Osteoarthrosis Of The Hip     Allergies     Obesity     Bipolar Disorder     Umbilical Hernia     Seborrheic Keratosis     Chronic Gout     Hypercholesterolemia     Acute idiopathic gout of right elbow     ALT (SGPT) level raised     Current Outpatient Medications   Medication Sig Dispense Refill     allopurinol (ZYLOPRIM) 300 MG tablet TAKE 1 AND 1/2 TABLETS(450 MG) BY MOUTH DAILY 135 tablet 0     No current facility-administered medications for this visit.          DETAILED EXAMINATION  19  :  Vitals:    19 0823   BP: 118/76   Patient Site: Right Arm   Patient Position: Sitting   Cuff Size: Adult Large   Pulse: 88   Weight: (!) 239 lb (108.4 kg)     Alert oriented. Head including the face is examined for malar rash, heliotropes, scarring, lupus pernio. Eyes examined for redness such as in episcleritis/scleritis, periorbital lesions.   Neck/ Face  examined for parotid gland swelling, range of motion of neck.  Left upper and lower and right upper and lower extremities examined for tenderness, swelling, warmth of the appendicular joints, range of motion, edema, rash.  Some of the important findings included: There is no acutely inflamed joint in upper and lower extremities.  No tophi are visible on upper extremity examination.  There is no dactylitis of the digits.  There is no enthesitis such as a Achilles.   .          LAB / IMAGING DATA:  ALT   Date Value Ref Range Status   09/09/2019 43 0 - 45 U/L Final   06/12/2019 34 0 - 45 U/L Final   02/27/2019 46 (H) 0 - 45 U/L Final     Albumin   Date Value Ref Range Status   09/09/2019 4.2 3.5 - 5.0 g/dL Final   06/12/2019 3.8 3.5 - 5.0 g/dL Final   02/27/2019 4.1 3.5 - 5.0 g/dL Final     Creatinine   Date Value Ref Range Status   09/09/2019 0.86 0.70 - 1.30 mg/dL Final   06/12/2019 0.80 0.70 - 1.30 mg/dL Final   02/27/2019 0.84 0.70 - 1.30 mg/dL Final       WBC   Date Value Ref Range Status   09/09/2019 6.2 4.0 - 11.0 thou/uL Final   06/12/2019 4.8 4.0 - 11.0 thou/uL Final   05/20/2015 4.8 4.0 - 11.0 thou/uL Final     Hemoglobin   Date Value Ref Range Status   09/09/2019 15.6 14.0 - 18.0 g/dL Final   06/12/2019 15.1 14.0 - 18.0 g/dL Final   02/27/2019 15.9 14.0 - 18.0 g/dL Final     Platelets   Date Value Ref Range Status   09/09/2019 237 140 - 440 thou/uL Final   06/12/2019 218 140 - 440 thou/uL Final   02/27/2019 223 140 - 440 thou/uL Final       No results found for: BENI

## 2021-06-02 VITALS — BODY MASS INDEX: 36.18 KG/M2 | WEIGHT: 245 LBS

## 2021-06-03 VITALS
SYSTOLIC BLOOD PRESSURE: 118 MMHG | WEIGHT: 239 LBS | HEART RATE: 88 BPM | DIASTOLIC BLOOD PRESSURE: 76 MMHG | BODY MASS INDEX: 35.29 KG/M2

## 2021-06-03 NOTE — TELEPHONE ENCOUNTER
Received refill request by fax from [facility] [address] [address] phone: [phone]  Fax: [phone]   Rx name: Prednisone  2.5 MG PER TAB   Quantity: unknown   REFILLS: unknown    Pt states he's using this pharm from now.   Pt declined Prednisone request.   Request order faxed back stating pt does not need this Rx for now.     SAJI [facility] 12/3/2019 4:49 PM

## 2021-06-04 VITALS
DIASTOLIC BLOOD PRESSURE: 80 MMHG | SYSTOLIC BLOOD PRESSURE: 118 MMHG | HEART RATE: 92 BPM | BODY MASS INDEX: 36.18 KG/M2 | WEIGHT: 245 LBS

## 2021-06-04 VITALS
HEART RATE: 90 BPM | SYSTOLIC BLOOD PRESSURE: 128 MMHG | BODY MASS INDEX: 36.38 KG/M2 | HEIGHT: 69 IN | WEIGHT: 245.6 LBS | OXYGEN SATURATION: 97 % | DIASTOLIC BLOOD PRESSURE: 90 MMHG

## 2021-06-05 NOTE — PROGRESS NOTES
Assessment/Plan:     1. Routine general medical examination at a health care facility  Routine healthcare maintenance.  Preventative cares reviewed.  Screening HIV, hepatitis C and PSA based on age criteria.  Repeat colonoscopy with prior colonoscopy January 12, 2010 normal told to repeat at 10-year interval.  Annual physical exams to continue.  - HIV Antigen/Antibody Screening Cascade; Future  - Hepatitis C Antibody (Anti-HCV); Future  - Ambulatory referral for Colonoscopy  - PSA (Prostatic-Specific Antigen), Annual Screen; Future    2. Class 2 obesity due to excess calories without serious comorbidity with body mass index (BMI) of 36.0 to 36.9 in adult  Dietary and exercise modification for weight goal less than 220 pounds initially, less than 200 pounds ideally.    3. Hypercholesterolemia  History of hypercholesterolemia.  Dietary and exercise modifications reviewed.  31 pound weight gain since July 5, 2017.  Therapeutic lifestyle changes as noted above.  - Lipid Cascade; Future    4. Chronic Gout  Chronic gout.  Allopurinol 600 mg daily without recent gout flare.  Patient has upcoming labs March 9 including uric acid for goal less than 5.0 ideally.  Lab monitoring with renal function, ALT, etc.  - Comprehensive Metabolic Panel; Future    5. Bipolar Disorder  History of bipolar disorder remaining off medication and feels no concern at this time for depression, anxiety etc.    6. Umbilical hernia without obstruction and without gangrene  Umbilical hernia, reducible.  Patient declines further intervention at this time.    7. ALT (SGPT) level raised  ALT level mildly elevated historically with likely component of hepatic steatosis etc.  Continue to monitor while on allopurinol.  - Comprehensive Metabolic Panel; Future    8. Non-recurrent bilateral inguinal hernia without obstruction or gangrene  Bilateral inguinal hernia, small, reducible left greater than right.  Asymptomatic otherwise patient declines further  "intervention.    9. Impaired fasting glucose  A1c and fasting glucose will be obtained at time of fasting labs March 9, 2020.  - Glycosylated Hemoglobin A1c; Future       The following are part of a depression follow up plan for the patient:  mental health care assessment  The following high BMI interventions were performed this visit: encouragement to exercise, weight monitoring, weight loss from baseline weight and lifestyle education regarding diet.  Ensure ongoing efforts to achieve weight goal < 220 pounds initially, < 200 pounds ideally.              Subjective:      Low Waller is a 59 y.o. male who presents for an annual exam.  Has gained weight.  Life stressors.  Wife being treated for triple negative stage IIIa breast cancer.  Patient continues allopurinol 600 mg daily for goal uric acid less than 5.  Not requiring colchicine.  No recent gout flares.  History of bipolar disorder.  Not requiring medication.  History of umbilical hernia.  Has had noted bilateral inguinal hernia on exam in the past however asymptomatic.  History of hypercholesterolemia.  Mild LFT elevation historically.  Nonfasting today.  Immunizations reviewed and up-to-date.  Had colonoscopy that was normal January 12, 2010 and does require repeat colonoscopy for screening purposes.  Comprehensive review of systems as above otherwise all negative.    \"Will\"   \"Leigha\" x 1998 (wife is ~ 11 years younger)   No children (wife lost a child at 6 weeks of pregnancy...)   No smoke (quit 1995, prior intermittent x 15 years < 1/4 ppd)   5 drinks / week, less now with h/o gout   Mom - dec CVA, Alzeihmer's   Dad - dec CVA   2 older bro -   1 older sis - uterine CA, doing fine currently   avocarrot -  - phone center   Hospitalized x 4 bipolar d/o 1980's   Surgeries: s/p tonsilectomy, dermabrasion, bilateral cataracts (2001); left hip resurfacing 2/26/2010 (Dr. Flores); 10/31/12 right BRUNA (Dr. Flores)   Volunteer: \"Read to " "Achieve\" (read with a 10 y.o. for 1 hour every , but she is painfully shy...)   Raised Worship but left Synagogue (believe in God)   PHQ-9 = 2/27 (10/17/12)     Healthy Habits:   Regular Exercise: No  Healthy Diet: No  Dental Visits Regularly: Yes  Seat Belt: Yes   Sexually active: Yes  Colonoscopy: Yes and 1/12/10 (repeat in 10 years)  Lipid Profile: Yes  Glucose Screen: Yes    Immunization History   Administered Date(s) Administered     DT (pediatric) 2000     Influenza, inj, historic,unspecified 2019     Influenza, seasonal,quad inj 6-35 mos 2009     Tdap 2007, 2017     Immunization status: up to date and documented.  Vision Screening:both eyes  Hearing: PASS     Current Outpatient Medications   Medication Sig Dispense Refill     allopurinol (ZYLOPRIM) 300 MG tablet Take 2 tablets (600 mg total) by mouth daily. 180 tablet 1     No current facility-administered medications for this visit.      Past Medical History:   Diagnosis Date     Depression      Past Surgical History:   Procedure Laterality Date     CATARACT EXTRACTION Bilateral      JOINT REPLACEMENT Bilateral     hip     TONSILLECTOMY       Demerol [meperidine] and Venom-honey bee  Family History   Problem Relation Age of Onset     Dementia Mother      Stroke Mother      Stroke Father      Social History     Socioeconomic History     Marital status:      Spouse name: Not on file     Number of children: Not on file     Years of education: Not on file     Highest education level: Not on file   Occupational History     Not on file   Social Needs     Financial resource strain: Not on file     Food insecurity:     Worry: Not on file     Inability: Not on file     Transportation needs:     Medical: Not on file     Non-medical: Not on file   Tobacco Use     Smoking status: Former Smoker     Last attempt to quit: 1995     Years since quittin.0     Smokeless tobacco: Never Used   Substance and Sexual Activity     " "Alcohol use: Yes     Alcohol/week: 1.7 standard drinks     Types: 2 Standard drinks or equivalent per week     Frequency: 2-3 times a week     Comment: occasionally      Drug use: No     Sexual activity: Not on file   Lifestyle     Physical activity:     Days per week: Not on file     Minutes per session: Not on file     Stress: Not on file   Relationships     Social connections:     Talks on phone: Not on file     Gets together: Not on file     Attends Jewish service: Not on file     Active member of club or organization: Not on file     Attends meetings of clubs or organizations: Not on file     Relationship status: Not on file     Intimate partner violence:     Fear of current or ex partner: Not on file     Emotionally abused: Not on file     Physically abused: Not on file     Forced sexual activity: Not on file   Other Topics Concern     Not on file   Social History Narrative     Not on file       Review of Systems  Comprehensive ROS: as above, otherwise all negative.           Objective:     /90 (Patient Site: Right Arm, Patient Position: Sitting, Cuff Size: Adult Large)   Pulse 90   Ht 5' 9\" (1.753 m)   Wt (!) 245 lb 9.6 oz (111.4 kg)   SpO2 97%   BMI 36.27 kg/m    Body mass index is 36.27 kg/m .    Physical    General Appearance:    Alert, cooperative, no distress, appears stated age.  Obesity.   Head:    Normocephalic, without obvious abnormality, atraumatic   Eyes:    PERRL, conjunctiva/corneas clear, EOM's intact, fundi     benign, both eyes        Ears:    Normal TM's and external ear canals, both ears   Nose:   Nares normal, septum midline, mucosa normal, no drainage    or sinus tenderness   Throat:   Lips, mucosa, and tongue normal; teeth and gums normal   Neck:   Supple, symmetrical, trachea midline, no adenopathy;        thyroid:  No enlargement/tenderness/nodules; no carotid    bruit or JVD   Back:     Symmetric, no curvature, ROM normal, no CVA tenderness   Lungs:     Clear to " auscultation bilaterally, respirations unlabored   Chest wall:    No tenderness or deformity   Heart:    Regular rate and rhythm, S1 and S2 normal, no murmur, rub   or gallop   Abdomen:     Soft, non-tender, bowel sounds active all four quadrants,     no masses, no organomegaly.     Genitalia:    Normal male without lesion, discharge or tenderness.  No inguinal hernia noted.     Rectal:    Normal tone.  Prostate normal/symmetric, no masses or tenderness.   Extremities:   Extremities normal, atraumatic, no cyanosis or edema   Pulses:   2+ and symmetric all extremities   Skin:   Skin color, texture, turgor normal, no rashes or lesions.  Rosacea.   Lymph nodes:   Cervical, supraclavicular, and axillary nodes normal   Neurologic:   CNII-XII intact. Normal strength, sensation and reflexes       throughout                This note has been dictated using voice recognition software and as a result may contain minor grammatical errors and unintended word substitutions.

## 2021-06-06 NOTE — PROGRESS NOTES
ASSESSMENT AND PLAN Low Waller 59 y.o. male is here for follow-up of crystal proven gout, on 450 mg of allopurinol, doing great without recurrence of his gout episodes during the past 6 months, serum urate is in target range liver function studies normal.  As well as he is doing we will meet here once a year now unless needed otherwise.  He will check labs every 6 months.          Diagnoses and all orders for this visit:    Chronic Gout  -     allopurinoL (ZYLOPRIM) 300 MG tablet; Take 2 tablets (600 mg total) by mouth daily.  Dispense: 180 tablet; Refill: 1             HISTORY OF PRESENTING ILLNESS:  Low Waller 59 y.o. is here for follow-up of gout, crystal proven.  He has tolerated allopurinol at 450 mg daily..  He is no longer on colchicine.  He has not had inflammation type symptoms, acute flareup of gout or even twinges that he has had in the past.  He noted pain level to be 0.  His wife is being treated for breast cancer.  He has noted no difficulty performing day-to-day activities.  There is no stiffness in the morning.  Recent labs are reviewed his ALT is now normal.  Serum urate is in target range.   .   He is trying to lose weight and his niece and himself are joined the [facility] where he is headed now after this visit.  He has not had fever or weight loss blurry vision rash cough nausea mouth ulcer or eye redness.     His gout typically affects the lower extremities especially feet 1 of the other side.  He reports no history of trauma.  In the past he has opted to treat gout on as-needed basis..Original episode affected the left first MTP joint. his joint stiffness is ongoing and his joint swelling is improved.  Limitation on activities include none. The patient is not avoiding high purine foods. Alcoholic as noted. Limitation on activities as noted in the MDHAQ scanned in the EMR.  Further historical information, including ROS as noted in the multidimensional health assessment  questionnaire scanned in the EMR and in the assessment and plan section.  No other joint areas and upper extremities and chest, neck or back affected.  He has not observed any tophi.       ALLERGIES:Demerol [meperidine] and Venom-honey bee    PAST MEDICAL/ACTIVE PROBLEMS/MEDICATION/SOCIAL DATA  Past Medical History:   Diagnosis Date     Depression      Social History     Tobacco Use   Smoking Status Former Smoker     Last attempt to quit: 1995     Years since quittin.1   Smokeless Tobacco Never Used     Patient Active Problem List   Diagnosis     Rosacea     Osteoarthrosis Of The Hip     Allergies     Obesity     Bipolar Disorder     Umbilical Hernia     Seborrheic Keratosis     Chronic Gout     Hypercholesterolemia     Acute idiopathic gout of right elbow     ALT (SGPT) level raised     Inflammatory heel pain, left     Current Outpatient Medications   Medication Sig Dispense Refill     cycloSPORINE (RESTASIS) 0.05 % ophthalmic emulsion Apply 1 drop to eye as needed.       predniSONE (DELTASONE) 10 mg tablet Take 10 mg by mouth as needed.  0     allopurinol (ZYLOPRIM) 300 MG tablet Take 2 tablets (600 mg total) by mouth daily. 180 tablet 1     No current facility-administered medications for this visit.          DETAILED EXAMINATION  20  :  Vitals:    20 0927   BP: 118/80   Patient Site: Right Arm   Patient Position: Sitting   Cuff Size: Adult Large   Pulse: 92   Weight: (!) 245 lb (111.1 kg)     Alert oriented. Head including the face is examined for malar rash, heliotropes, scarring, lupus pernio. Eyes examined for redness such as in episcleritis/scleritis, periorbital lesions.   Neck/ Face examined for parotid gland swelling, range of motion of neck.  Left upper and lower and right upper and lower extremities examined for tenderness, swelling, warmth of the appendicular joints, range of motion, edema, rash.  Some of the important findings included: There is no acutely inflamed joint amongst  the palpable upper extremity articulations, knees without effusion warmth or joint line tenderness.  No dactylitis of digits.    .          LAB / IMAGING DATA:  ALT   Date Value Ref Range Status   03/09/2020 37 0 - 45 U/L Final   12/16/2019 44 0 - 45 U/L Final   09/09/2019 43 0 - 45 U/L Final     Albumin   Date Value Ref Range Status   03/09/2020 3.8 3.5 - 5.0 g/dL Final   09/09/2019 4.2 3.5 - 5.0 g/dL Final   06/12/2019 3.8 3.5 - 5.0 g/dL Final     Creatinine   Date Value Ref Range Status   03/09/2020 0.77 0.70 - 1.30 mg/dL Final   12/16/2019 0.82 0.70 - 1.30 mg/dL Final   09/09/2019 0.86 0.70 - 1.30 mg/dL Final       WBC   Date Value Ref Range Status   03/09/2020 6.1 4.0 - 11.0 thou/uL Final   12/16/2019 6.2 4.0 - 11.0 thou/uL Final   05/20/2015 4.8 4.0 - 11.0 thou/uL Final     Hemoglobin   Date Value Ref Range Status   03/09/2020 15.2 14.0 - 18.0 g/dL Final   12/16/2019 16.4 14.0 - 18.0 g/dL Final   09/09/2019 15.6 14.0 - 18.0 g/dL Final     Platelets   Date Value Ref Range Status   03/09/2020 209 140 - 440 thou/uL Final   12/16/2019 232 140 - 440 thou/uL Final   09/09/2019 237 140 - 440 thou/uL Final       No results found for: BENI

## 2021-06-11 NOTE — PROGRESS NOTES
Assessment:     1. Routine general medical examination at a health care facility     2. Non morbid obesity due to excess calories     3. Hypercholesterolemia  Lipid Cascade    Thyroid Stimulating Hormone (TSH)   4. Bipolar Disorder  Vitamin D, Total (25-Hydroxy)   5. Chronic Gout  Uric Acid    Basic Metabolic Panel   6. Allergies     7. Umbilical hernia without obstruction and without gangrene     8. Inguinal hernia recurrent bilateral          Plan:      Routine healthcare maintenance.  Preventative cares reviewed.  Colonoscopy January 12, 2010 normal told to repeat 10 year interval.  Followed by Dr. virk perhaps once yearly for history of chronic gout with occasional recurrence.  Asymptomatic currently.  Check uric acid level, basic metabolic panel.  Prior CBC normal.  Check lipid cascade with history of hypercholesterolemia.  Check TSH regarding cluster elevation as well.  Vitamin D level with history of bipolar disorder, stable off medication.  Ragweed allergies reviewed question improvement however does have some described mold allergy often worse in the winter despite allergy testing negative for mold allergen response.  Umbilical hernia moderate to large, asymptomatic and patient declines further intervention as well as noted small bilateral reducible inguinal hernia on exam today.  Annual physical exams to continue.    I have had an Advance Directives discussion with the patient.  The following high BMI interventions were performed this visit: encouragement to exercise, weight monitoring, weight loss from baseline weight and lifestyle education regarding diet.  Weight goal < 200 pounds initially, < 190 pounds ideally.         Subjective:      Low Waller is a 56 y.o. male who presents for an annual exam.  Doing well.  Mood has been stable off medication with history of bipolar disorder.  Last gout attack perhaps 6 weeks ago takes 1 or 2 tablets of prednisone and typically symptoms resolved.   "Followed by Dr. virk typically yearly basis.  Prior uric acid level is high as 9.4.  Does have history of cholesterol elevation.  Diet controlled.  Lifting weights which helps with overall improvement in mood as well as noticing increase strength as a result.  Ragweed allergy previously identified.  Umbilical hernia otherwise remains asymptomatic.  Comprehensive review of systems as above otherwise all negative.    \"Will\"   \"Leigha\" x 1998 (wife is ~ 11 years younger)   No children (wife lost a child at 6 weeks of pregnancy...)   No smoke (quit 1995, prior intermittent x 15 years < 1/4 ppd)   5 drinks / week, less now with h/o gout   Mom - dec CVA, Alzeihmer's   Dad - dec CVA   2 older bro -   1 older sis - uterine CA, doing fine currently   [employer] -  - phone center   Hospitalized x 4 bipolar d/o 1980's   Surgeries: s/p tonsilectomy, dermabrasion, bilateral cataracts (2001); left hip resurfacing 2/26/2010 (Dr. lFores); 10/31/12 right BRUNA (Dr. Flores)   Volunteer: \"[employer]\" (read with a 10 y.o. for 1 hour every Sunday, but she is painfully shy...)   Raised Alevism but left Mu-ism (believe in God)   PHQ-9 = 2/27 (10/17/12)     Healthy Habits:   Regular Exercise: Yes  Healthy Diet: Yes  Dental Visits Regularly: Yes  Seat Belt: Yes   Sexually active: Yes  Colonoscopy: Yes and 1/12/10 (repeat in 10 years)  Lipid Profile: Yes  Glucose Screen: Yes    Immunization History   Administered Date(s) Administered     DT (pediatric) 09/12/2000     Influenza, seasonal,quad inj 6-35 mos 12/01/2009     Tdap 05/08/2007, 05/31/2017     Immunization status: up to date and documented.  Vision Screening:both eyes  Hearing: PASS     Current Outpatient Prescriptions   Medication Sig Dispense Refill     cycloSPORINE (RESTASIS) 0.05 % ophthalmic emulsion Administer 1 drop to both eyes every 12 (twelve) hours. Daily       No current facility-administered medications for this visit.      Past Medical " "History:   Diagnosis Date     Depression      Past Surgical History:   Procedure Laterality Date     CATARACT EXTRACTION Bilateral      JOINT REPLACEMENT Bilateral     hip     TONSILLECTOMY       Venom-honey bee  Family History   Problem Relation Age of Onset     Dementia Mother      Stroke Mother      Stroke Father      Social History     Social History     Marital status:      Spouse name: N/A     Number of children: N/A     Years of education: N/A     Occupational History     Not on file.     Social History Main Topics     Smoking status: Former Smoker     Quit date: 1/16/1995     Smokeless tobacco: Not on file     Alcohol use 1.0 oz/week     2 drink(s) per week      Comment: occasionally      Drug use: No     Sexual activity: Not on file     Other Topics Concern     Not on file     Social History Narrative       Review of Systems  Comprehensive ROS: as above, otherwise all negative.           Objective:     /80  Pulse 68  Ht 5' 9\" (1.753 m)  Wt 214 lb (97.1 kg)  BMI 31.6 kg/m2  Body mass index is 31.6 kg/(m^2).    Physical    General Appearance:    Alert, cooperative, no distress, appears stated age.  Obesity.   Head:    Normocephalic, without obvious abnormality, atraumatic   Eyes:    PERRL, conjunctiva/corneas clear, EOM's intact, fundi     benign, both eyes        Ears:    Normal TM's and external ear canals, both ears   Nose:   Nares normal, septum midline, mucosa normal, no drainage    or sinus tenderness   Throat:   Lips, mucosa, and tongue normal; teeth and gums normal   Neck:   Supple, symmetrical, trachea midline, no adenopathy;        thyroid:  No enlargement/tenderness/nodules; no carotid    bruit or JVD   Back:     Symmetric, no curvature, ROM normal, no CVA tenderness   Lungs:     Clear to auscultation bilaterally, respirations unlabored   Chest wall:    No tenderness or deformity   Heart:    Regular rate and rhythm, S1 and S2 normal, no murmur, rub   or gallop   Abdomen:     Soft, " non-tender, bowel sounds active all four quadrants,     no masses, no organomegaly.  Moderate to large umbilical hernia, reducible per    Genitalia:    Normal male without lesion, discharge or tenderness.  Small bilateral inguinal hernia noted.     Rectal:    Normal tone.  Prostate normal/symmetric, no masses or tenderness.   Extremities:   Extremities normal, atraumatic, no cyanosis or edema   Pulses:   2+ and symmetric all extremities   Skin:   Skin color, texture, turgor normal, no rashes or lesions   Lymph nodes:   Cervical, supraclavicular, and axillary nodes normal   Neurologic:   CNII-XII intact. Normal strength, sensation and reflexes       throughout

## 2021-06-11 NOTE — PROGRESS NOTES
Subjective:      Low Waller is a 56 y.o. male comes in for evaluation of the right thumb laceration that occurred 630 this morning when he was slicing onions with a mandolin.  He had dressed the wound and the bleeding was controlled.  He went to work, but then came here for evaluation because his tetanus was last in 2007 and he wanted to be able to continue working in the garden today.    Objective:       Gen: well appearing    Right thumb: small clean avulsion on the tip of the thumb. Slight incision to the radial side of the thumb nail. Bleeding is controlled.       Assessment/Plan:       Small avulsion lac.     Tetanus was updated.     I dressed the wound with abx ointment and a band aid. I wrapped the thumb with coban to keep clean while he works in he garden.

## 2021-06-12 NOTE — PROGRESS NOTES
ASSESSMENT AND PLAN Low Waller 56 y.o. male   is in for follow-up of gout.  He had decided to go for as needed treatment plan for gout.  Prednisone is refilled for him for future use, in case of a gout flareup.  Major side effects of steroids including ocular metabolic bone related to name a few were discussed.  He is to return for follow-up on as-needed basis or in 12 months..    Diagnoses and all orders for this visit:    Chronic Gout  -     predniSONE (DELTASONE) 20 MG tablet; Take 1 tablet (20 mg total) by mouth daily for 15 days.  Dispense: 15 tablet; Refill: 2           HISTORY OF PRESENTING ILLNESS:  Low aWller 56 y.o. is here for follow up of gout.  His originally symptoms presented a few years. Onset was sudden. The patient reports 1 attacks involving the left forefoot.  This was severe, happened while he was visiting [place], he had prednisone that he started taking and has improved.  His has no history of kidney stones.  We reviewed the prior feet x-rays reports without evidence of erosions.  His symptoms are unchanged.Original episode affected the left first MTP joint. his joint stiffness is ongoing and his joint swelling is improved.  Limitation on activities include none. The patient is not avoiding high purine foods. Alcoholic as noted. Limitation on activities as noted in the MDHAQ scanned in the EMR.  Further historical information, including ROS as noted in the multidimensional health assessment questionnaire scanned in the EMR and in the assessment and plan section.  No other joint areas and upper extremities and chest, neck or back affected.  He has not observed any tophi.       ALLERGIES:Venom-honey bee    PAST MEDICAL/ACTIVE PROBLEMS/MEDICATION/SOCIAL DATA  Past Medical History:   Diagnosis Date     Depression      History   Smoking Status     Former Smoker     Quit date: 1/16/1995   Smokeless Tobacco     Not on file     Patient Active Problem List   Diagnosis      Rosacea     Osteoarthrosis Of The Hip     Allergies     Obesity     Bipolar Disorder     Umbilical Hernia     Seborrheic Keratosis     Chronic Gout     Hypercholesterolemia     Current Outpatient Prescriptions   Medication Sig Dispense Refill     cycloSPORINE (RESTASIS) 0.05 % ophthalmic emulsion Administer 1 drop to both eyes every 12 (twelve) hours. Daily       No current facility-administered medications for this visit.          DETAILED EXAMINATION:  There were no vitals filed for this visit. Comfortable.  Alert oriented.  Eyes are without inflammatory changes.  Examination of both upper and lower extremities is performed for swollen & tender joints, range of motion, rash, weakness, discoloration, warmth, swelling.  The skin examined for nodules. The salient normal / abnormal findings are appended.  He has no acutely inflamed joints amongst his appendicular system.    LAB / IMAGING DATA:  ALT   Date Value Ref Range Status   12/01/2009 32 <46 IU/L Final     Albumin   Date Value Ref Range Status   12/01/2009 4.3 3.5 - 5.0 g/dL Final     Creatinine   Date Value Ref Range Status   07/05/2017 0.82 0.70 - 1.30 mg/dL Final   05/20/2015 0.77 0.70 - 1.30 mg/dL Final   05/13/2014 0.74 0.70 - 1.30 mg/dL Final       WBC   Date Value Ref Range Status   05/20/2015 4.8 4.0 - 11.0 thou/uL Final   03/27/2014 8.6 4.0 - 11.0 thou/uL Final   05/07/2013 5.3 4.0 - 11.0 thou/uL Final     Hemoglobin   Date Value Ref Range Status   05/20/2015 16.7 14.0 - 18.0 g/dL Final   03/27/2014 15.3 14.0 - 18.0 g/dL Final   05/07/2013 14.8 14.0 - 18.0 g/dL Final     Platelets   Date Value Ref Range Status   05/20/2015 209 140 - 440 thou/uL Final   03/27/2014 213 140 - 440 thou/uL Final   05/07/2013 194 140 - 440 thou/uL Final       No results found for: BENI

## 2021-06-15 NOTE — PROGRESS NOTES
Low Waller is a 60 y.o. male who is being evaluated via a billable video visit.      How would you like to obtain your AVS? MyChart.  If dropped from the video visit, the video invitation should be resent by: Text to cell phone: [phone]  Will anyone else be joining your video visit? No        Video-Visit Details    Type of service:  Video Visit  Start: 03/11/2021 08:02 am   Stop: 03/11/2021 08:13 am  Originating Location (pt. Location): Home    Distant Location (provider location):  [facility]     Platform used for Video Visit: Heyo      This document was created using a software with less than 100% fidelity, at times resulting in unintended, even erroneous syntax and grammar.  The reader is advised to keep this under consideration while reviewing, interpreting this note.           ASSESSMENT AND PLAN:    Diagnoses and all orders for this visit:    Chronic Gout  -     allopurinoL (ZYLOPRIM) 300 MG tablet; Take 2 tablets (600 mg total) by mouth daily.  Dispense: 180 tablet; Refill: 1    Chronic left shoulder pain    Primary osteoarthritis involving multiple joints    Class 2 obesity due to excess calories without serious comorbidity in adult, unspecified BMI        Follow up in 1 year      HISTORY OF PRESENTING ILLNESS:  Low Wlaler 60 y.o. is evaluated here via video/audio link.  This is for follow-up of gout, crystal proven.  He has done great with that regards to gout has not had relapse over the past 12 months, serum urate is in target range of 600 mg of allopurinol daily, is not on colchicine.  He has been under a bit of a stress of late with his wife's breast cancer.  He has noted left shoulder discomfort that troubles him with activity such as reaching behind his back such as to scratch.  It does not wake him up from sleep.  No history of recent trauma.  He has osteoarthritis.  Occasional knee pain.  Bilateral hip replacement.  We discussed losing weight is  important in this context.  He is to stay the course with allopurinol.  Follow-up in 12 months labs every 6 months.  Covid vaccination discussed.   ROS enquiry held for fever, ocular symptoms, rash, headache,  GI issues.  Today we also discussed the issues related to the current pandemic, the pros and cons of the current treatment plan, the CDC guidelines such as social distancing washing the hands covering the cough.  ALLERGIES:Demerol [meperidine] and Venom-honey bee    PAST MEDICAL/ACTIVE PROBLEMS/MEDICATION/SOCIAL DATA  Past Medical History:   Diagnosis Date     Depression      Social History     Tobacco Use   Smoking Status Former Smoker     Quit date: 1995     Years since quittin.1   Smokeless Tobacco Never Used     Patient Active Problem List   Diagnosis     Rosacea     Osteoarthrosis Of The Hip     Allergies     Obesity     Bipolar Disorder     Umbilical Hernia     Seborrheic Keratosis     Chronic Gout     Hypercholesterolemia     Current Outpatient Medications   Medication Sig Dispense Refill     cycloSPORINE (RESTASIS) 0.05 % ophthalmic emulsion Apply 1 drop to eye as needed.       predniSONE (DELTASONE) 10 mg tablet Take 10 mg by mouth as needed.  0     allopurinoL (ZYLOPRIM) 300 MG tablet Take 2 tablets (600 mg total) by mouth daily. 180 tablet 1     No current facility-administered medications for this visit.          EXAMINATION:    Using the audio and video link as best as possible the constitutional, neck, neurologic, psych, skin, both upper extremities areas/organ system were evaluated during this assessment.  Some of the important findings: Alert, oriented, speech fluent.    Able to fully flex the digits, into fists bilaterally, wrist and elbow range of motion appear normal, abduction of the shoulder is normal.  No tophaceous deposits seen in the digits olecranon areas.      LAB / IMAGING DATA:  ALT   Date Value Ref Range Status   2021 36 0 - 45 U/L Final   2020 59 (H) 0 - 45  U/L Final   03/09/2020 37 0 - 45 U/L Final     Albumin   Date Value Ref Range Status   03/08/2021 3.9 3.5 - 5.0 g/dL Final   09/17/2020 4.5 3.5 - 5.0 g/dL Final   03/09/2020 3.8 3.5 - 5.0 g/dL Final     Creatinine   Date Value Ref Range Status   03/08/2021 0.80 0.70 - 1.30 mg/dL Final   09/17/2020 0.82 0.70 - 1.30 mg/dL Final   03/09/2020 0.77 0.70 - 1.30 mg/dL Final       WBC   Date Value Ref Range Status   03/08/2021 6.8 4.0 - 11.0 thou/uL Final   09/17/2020 9.4 4.0 - 11.0 thou/uL Final   05/20/2015 4.8 4.0 - 11.0 thou/uL Final     Hemoglobin   Date Value Ref Range Status   03/08/2021 15.0 14.0 - 18.0 g/dL Final   09/17/2020 17.2 14.0 - 18.0 g/dL Final   03/09/2020 15.2 14.0 - 18.0 g/dL Final     Platelets   Date Value Ref Range Status   03/08/2021 249 140 - 440 thou/uL Final   09/17/2020 250 140 - 440 thou/uL Final   03/09/2020 209 140 - 440 thou/uL Final       No results found for: BENI, RF, SEDRATE

## 2021-06-16 NOTE — PROGRESS NOTES
"    ASSESSMENT AND PLAN Low Waller 57 y.o. male is a for exacerbation of pain in his right elbow where he has features of inflammatory arthropathy likely due to acute flareup of gout.  We discussed septic arthritis.  He wants to defer aspiration/injection.  He would like to go and try the prednisone first.  30 mg daily for 5 days and then 20 mg for 10 days.  Should this not get better he will return in the next few days.  He is more inclined to consider starting to take long-term medication instead of as needed for management of gout.  I will meet here in 3 months or sooner.      Diagnoses and all orders for this visit:    Chronic Gout  -     Discontinue: predniSONE (DELTASONE) 20 MG tablet; TAKE 1 TABLET(20 MG) BY MOUTH DAILY FOR 15 DAYS  Dispense: 20 tablet; Refill: 0    Acute idiopathic gout of right elbow  -     predniSONE (DELTASONE) 20 MG tablet; Take 30 mg for 5 days then reduce to 20 mg for the next 10 days.  Dispense: 20 tablet; Refill: 1             HISTORY OF PRESENTING ILLNESS:  Low Waller 57 y.o. is here for flareup.  This is of pain.  This is in his right elbow.  This is gone on for the past 10 days.  This came on fairly quickly.  It reached a crescendo in about 2 days.  He started prednisone.  It improved.  However he stopped taking prednisone after 5 days.  This pain has continued at a lower intensity he is unable to fully \"hyperextended\" his right elbow.  This does not happen in his joints in the upper extremities before.  His gout typically affects the lower extremities especially feet 1 of the other side.  He reports no history of trauma.  In the past he has opted to treat gout on as-needed basis..Original episode affected the left first MTP joint. his joint stiffness is ongoing and his joint swelling is improved.  Limitation on activities include none. The patient is not avoiding high purine foods. Alcoholic as noted. Limitation on activities as noted in the MDHAQ scanned in the " "EMR.  Further historical information, including ROS as noted in the multidimensional health assessment questionnaire scanned in the EMR and in the assessment and plan section.  No other joint areas and upper extremities and chest, neck or back affected.  He has not observed any tophi.       ALLERGIES:Venom-honey bee    PAST MEDICAL/ACTIVE PROBLEMS/MEDICATION/SOCIAL DATA  Past Medical History:   Diagnosis Date     Depression      History   Smoking Status     Former Smoker     Quit date: 1/16/1995   Smokeless Tobacco     Never Used     Patient Active Problem List   Diagnosis     Rosacea     Osteoarthrosis Of The Hip     Allergies     Obesity     Bipolar Disorder     Umbilical Hernia     Seborrheic Keratosis     Chronic Gout     Hypercholesterolemia     Acute idiopathic gout of right elbow     Current Outpatient Prescriptions   Medication Sig Dispense Refill     cycloSPORINE (RESTASIS) 0.05 % ophthalmic emulsion Administer 1 drop to both eyes every 12 (twelve) hours. Daily       predniSONE (DELTASONE) 20 MG tablet Take 30 mg for 5 days then reduce to 20 mg for the next 10 days. 20 tablet 1     No current facility-administered medications for this visit.          DETAILED EXAMINATION  03/26/18  :  Vitals:    03/26/18 1634   BP: 136/84   Pulse: 90   Weight: 221 lb 3.2 oz (100.3 kg)   Height: 5' 9\" (1.753 m)     Alert oriented. Head including the face is examined for malar rash, heliotropes, scarring, lupus pernio. Eyes examined for redness such as in episcleritis/scleritis, periorbital lesions.   Neck/ Face examined for parotid gland swelling, range of motion of neck.  Left upper and lower and right upper and lower extremities examined for tenderness, swelling, warmth of the appendicular joints, range of motion, edema, rash.  Some of the important findings included: He has warmth tenderness and swelling of the right elbow joint.  I discussed this with him.  No other acutely inflamed joints are seen amongst the " appendicular system.          LAB / IMAGING DATA:  ALT   Date Value Ref Range Status   12/01/2009 32 <46 IU/L Final     Albumin   Date Value Ref Range Status   12/01/2009 4.3 3.5 - 5.0 g/dL Final     Creatinine   Date Value Ref Range Status   07/05/2017 0.82 0.70 - 1.30 mg/dL Final   05/20/2015 0.77 0.70 - 1.30 mg/dL Final   05/13/2014 0.74 0.70 - 1.30 mg/dL Final       WBC   Date Value Ref Range Status   05/20/2015 4.8 4.0 - 11.0 thou/uL Final   03/27/2014 8.6 4.0 - 11.0 thou/uL Final   05/07/2013 5.3 4.0 - 11.0 thou/uL Final     Hemoglobin   Date Value Ref Range Status   05/20/2015 16.7 14.0 - 18.0 g/dL Final   03/27/2014 15.3 14.0 - 18.0 g/dL Final   05/07/2013 14.8 14.0 - 18.0 g/dL Final     Platelets   Date Value Ref Range Status   05/20/2015 209 140 - 440 thou/uL Final   03/27/2014 213 140 - 440 thou/uL Final   05/07/2013 194 140 - 440 thou/uL Final       No results found for: BENI

## 2021-06-17 NOTE — PROGRESS NOTES
ASSESSMENT AND PLAN Low Waller 57 y.o. male is here for follow-up.  He has clinically diagnosed.  Had a recurrence of pain in her right elbow after taking prednisone as previously there was improvement by about 90% of the prednisone a few days later there was recurrence.  This is significant pain now.  He has synovitis of the right elbow.  I have recommended once again that he consider aspiration and injection he wants to proceed.  Under aseptic technique 2 drops of fluid were obtained, ultrasound and already shown minimal fluid.  40 mg of Kenalog was injected through the same portal.  Start him on prednisone, colchicine.  Once his current flareup subsides we will get together for the course to be charted he is more inclined to consider taking serum urate lowering management.  Will meet here in 6 weeks.        Diagnoses and all orders for this visit:    Acute idiopathic gout of right elbow  -     triamcinolone acetonide 40 mg/mL injection 40 mg (KENALOG-40); Inject 1 mL (40 mg total) into the joint once.  -     Crystals, Body Fluid  -     predniSONE (DELTASONE) 20 MG tablet; Take 1.5 tablets (30 mg total) by mouth daily for 15 days.  Dispense: 23 tablet; Refill: 1  -     colchicine (MITIGARE) 0.6 mg cap; Take 0.6 mg by mouth daily.  Dispense: 90 capsule; Refill: 0             HISTORY OF PRESENTING ILLNESS:  Low Waller 57 y.o. is here for flareup.  He has noted severe pain.  This is in his right elbow.  On his previous visit he had synovitis.  He was started on prednisone.  He improved significantly.  He thinks about 90%.  Few days later there was recurrence.  He has not difficult time moving the elbow joint and a variety of directions, difficult time with very his day-to-day activities.  There is no history of fall.  He has been taking nonsteroidals which have not helped significantly.    His gout typically affects the lower extremities especially feet 1 of the other side.  He reports no history  of trauma.  In the past he has opted to treat gout on as-needed basis..Original episode affected the left first MTP joint. his joint stiffness is ongoing and his joint swelling is improved.  Limitation on activities include none. The patient is not avoiding high purine foods. Alcoholic as noted. Limitation on activities as noted in the MDHAQ scanned in the EMR.  Further historical information, including ROS as noted in the multidimensional health assessment questionnaire scanned in the EMR and in the assessment and plan section.  No other joint areas and upper extremities and chest, neck or back affected.  He has not observed any tophi.       ALLERGIES:Venom-honey bee    PAST MEDICAL/ACTIVE PROBLEMS/MEDICATION/SOCIAL DATA  Past Medical History:   Diagnosis Date     Depression      History   Smoking Status     Former Smoker     Quit date: 1/16/1995   Smokeless Tobacco     Never Used     Patient Active Problem List   Diagnosis     Rosacea     Osteoarthrosis Of The Hip     Allergies     Obesity     Bipolar Disorder     Umbilical Hernia     Seborrheic Keratosis     Chronic Gout     Hypercholesterolemia     Acute idiopathic gout of right elbow     Current Outpatient Prescriptions   Medication Sig Dispense Refill     cycloSPORINE (RESTASIS) 0.05 % ophthalmic emulsion Administer 1 drop to both eyes every 12 (twelve) hours. Daily       diclofenac (VOLTAREN) 50 MG EC tablet Take 1 tablet (50 mg total) by mouth 3 (three) times a day. 60 tablet 0     No current facility-administered medications for this visit.          DETAILED EXAMINATION  04/27/18  :  Vitals:    04/27/18 0841   BP: 128/80   Resp: 16   Weight: (!) 232 lb (105.2 kg)     Alert oriented. Head including the face is examined for malar rash, heliotropes, scarring, lupus pernio. Eyes examined for redness such as in episcleritis/scleritis, periorbital lesions.   Neck/ Face examined for parotid gland swelling, range of motion of neck.  Left upper and lower and right  upper and lower extremities examined for tenderness, swelling, warmth of the appendicular joints, range of motion, edema, rash.  Some of the important findings included: Synovitis of the right elbow and loss of range of motion 50  of flexion contracture.           LAB / IMAGING DATA:  ALT   Date Value Ref Range Status   12/01/2009 32 <46 IU/L Final     Albumin   Date Value Ref Range Status   12/01/2009 4.3 3.5 - 5.0 g/dL Final     Creatinine   Date Value Ref Range Status   07/05/2017 0.82 0.70 - 1.30 mg/dL Final   05/20/2015 0.77 0.70 - 1.30 mg/dL Final   05/13/2014 0.74 0.70 - 1.30 mg/dL Final       WBC   Date Value Ref Range Status   05/20/2015 4.8 4.0 - 11.0 thou/uL Final   03/27/2014 8.6 4.0 - 11.0 thou/uL Final   05/07/2013 5.3 4.0 - 11.0 thou/uL Final     Hemoglobin   Date Value Ref Range Status   05/20/2015 16.7 14.0 - 18.0 g/dL Final   03/27/2014 15.3 14.0 - 18.0 g/dL Final   05/07/2013 14.8 14.0 - 18.0 g/dL Final     Platelets   Date Value Ref Range Status   05/20/2015 209 140 - 440 thou/uL Final   03/27/2014 213 140 - 440 thou/uL Final   05/07/2013 194 140 - 440 thou/uL Final       No results found for: BENI

## 2021-06-17 NOTE — PROGRESS NOTES
Assessment/Plan:    1. Right elbow pain  2. Chronic Gout  X-ray is not indicative of any obvious fracture, dislocation or other abnormal findings. Will have radiology confirm this.  I suspect pain could be related to his suspected gout attack.  Will refer patient back to rheumatologist for possible aspiration of the joint and further management of gout symptoms.  He prefers not to have uric acid level drawn today.  Patient is interested in a referral for physical therapy in the meantime to help with range of motion and strength. Per patient request, will provide refill of diclofenac 50 mg up to 3 times a day as needed for pain since this has been providing relief.  He should return to clinic if symptoms worsen or fail to improve despite evaluation and management by rheumatology.  Patient understands and is content with this plan of care.  - XR Elbow Right 2 VWS; Future  - Ambulatory referral to PT/OT  - diclofenac (VOLTAREN) 50 MG EC tablet; Take 1 tablet (50 mg total) by mouth 3 (three) times a day.  Dispense: 60 tablet; Refill: 0    Subjective:    Low Waller is a 57-year-old male seen today for evaluation of right arm pain.  Patient has had pain for about a month.  Had what he believes to be a gout attack in his right elbow about a month ago. Was seen by his rheumatologist at that time who prescribed him short course of prednisone.  This helped with the pain initially however stiffness and discomfort returned after finishing the course of prednisone.  Uric acid level was not drawn at that time, however rheumatologist had high suspicion pain was likely related to gout and encouraged patient to consider getting on a daily preventative medication, such as allopurinol.  Since then he has had stiffness, discomfort, limited range of motion.  Has had gout attacks primarily in the feet.  This is the first time he has had elbow pain which makes him wonder if pain is related to gout or something else.  Does note  "that he was shoveling snow last week and, wonders if this could have aggravated it. Otherwise there has not been any injury or provoking event htat he can recall. Reports a sensation of something being in his elbow.  It feels full and more swollen than his left elbow.  Has not noticed any redness since a month ago.  Been taking an old prescription of diclofenac. This has been helping manage the pain.  Would like a refill of this medication today. Review of systems is as stated in HPI, and the remainder of the 10 system review is otherwise unremarkable.    Past Medical History, Family History, and Social History reviewed.  \"Will\"   \"Leigha\" x 1998 (wife is ~ 11 years younger)   No children (wife lost a child at 6 weeks of pregnancy...)   No smoke (quit 1995, prior intermittent x 15 years < 1/4 ppd)   5 drinks / week, less now with h/o gout   Mom - dec CVA, Alzeihmer's   Dad - dec CVA   2 older bro -   1 older sis - uterine CA, doing fine currently   [employer] -  - phone center   Hospitalized x 4 bipolar d/o 1980's   Surgeries: s/p tonsilectomy, dermabrasion, bilateral cataracts (2001); left hip resurfacing 2/26/2010 (Dr. Flores); 10/31/12 right BRUNA (Dr. Flores)   Volunteer: \"[employer]\" (read with a 10 y.o. for 1 hour every Sunday, but she is painfully shy...)   Raised [employer] but left [employer] (believe in God)   PHQ-9 = 2/27 (10/17/12)     Past Surgical History:   Procedure Laterality Date     CATARACT EXTRACTION Bilateral      JOINT REPLACEMENT Bilateral     hip     TONSILLECTOMY          Family History   Problem Relation Age of Onset     Dementia Mother      Stroke Mother      Stroke Father         Past Medical History:   Diagnosis Date     Depression         Social History   Substance Use Topics     Smoking status: Former Smoker     Quit date: 1/16/1995     Smokeless tobacco: Never Used     Alcohol use 1.0 oz/week     2 Standard drinks or equivalent per week      Comment: occasionally     " "    Current Outpatient Prescriptions   Medication Sig Dispense Refill     cycloSPORINE (RESTASIS) 0.05 % ophthalmic emulsion Administer 1 drop to both eyes every 12 (twelve) hours. Daily       diclofenac (VOLTAREN) 50 MG EC tablet Take 1 tablet (50 mg total) by mouth 3 (three) times a day. 60 tablet 0     predniSONE (DELTASONE) 20 MG tablet Take 30 mg for 5 days then reduce to 20 mg for the next 10 days. 20 tablet 1     No current facility-administered medications for this visit.         Objective:    Vitals:    04/23/18 1033   BP: 152/82   Patient Site: Left Arm   Patient Position: Sitting   Cuff Size: Adult Regular   Pulse: 80   Weight: (!) 233 lb 6.4 oz (105.9 kg)   Height: 5' 9\" (1.753 m)      Body mass index is 34.47 kg/(m^2).      General Appearance:  Alert, cooperative, no distress, appears stated age   Lungs:   Clear to auscultation bilaterally, respirations unlabored.  No expiratory wheeze or inspiratory crackles noted.   Heart:  Regular rate and rhythm, S1, S2 normal, no murmur, rub or gallop   Extremities:  Right elbow with mild edema and limited range of motion.  No erythema, ecchymosis or obvious deformity noted.  Extremities otherwise normal.  Pulses and reflexes equal in all extremities.   Skin: Warm, dry.  Skin color, texture, turgor normal, no rashes or lesions     Right elbow x-ray was ordered and personally reviewed in clinic today.    This note has been dictated using voice recognition software. Any grammatical or context distortions are unintentional and inherent to the use of this software.     "

## 2021-06-19 NOTE — PROGRESS NOTES
ASSESSMENT AND PLAN Low Waller 57 y.o. male is here for follow-up.  He now has a crystal proven gout.  He had a flareup recently in April involvement of the elbow on the right side.  He has now a left second toe acute flareup that he is already partially controlled with oral prednisone ×3 doses.  He has the option of taking more prednisone or injection into the flex left second MTP.  He has decided as discussed in the previous visit to go for preventative treatment.  Once he heals the current episode he will start allopurinol as noted.  He will take colchicine if he can tolerate at 0.6 mg twice daily.  A refill on prednisone given.  I reiterated the potential for flareup of gout and he is well aware of though the management of those.  He will check his labs in 8 weeks and then follow-up right thereafter.       Diagnoses and all orders for this visit:    Acute idiopathic gout of right elbow  -     allopurinol (ZYLOPRIM) 100 MG tablet; 100 mg daily for 1 week, increase to 200 mg throughout the second week and then 300 mg daily.  Dispense: 90 tablet; Refill: 1  -     colchicine (MITIGARE) 0.6 mg cap; Take 0.6 mg by mouth 2 (two) times a day.  Dispense: 180 capsule; Refill: 0  -     predniSONE (DELTASONE) 20 MG tablet; Take 1 tablet (20 mg total) by mouth daily for 15 days.  Dispense: 15 tablet; Refill: 0             HISTORY OF PRESENTING ILLNESS:  Low Waller 57 y.o. is here for flareup.  He was seen here recently in April.  At that point he had a right elbow aspirated demonstrating MSU crystals.  His gout has flared up.  This time it is in the left second toe.  He took prednisone 3 doses which helped him significantly.  He is able to ambulate.  He rates the pain at the peak at 2.5/10.  Between the episode in the elbow in April and now he has not had any significant symptoms.  He was somewhat surprised to see the cost of colchicine having gone up so much compared to what it used to be when he took it  previously.  He rated the pain and that was moderately severe interfering with some of the day-to-day activities.  There is no morning stiffness.  He has not had a rash.  No history of trauma.    His gout typically affects the lower extremities especially feet 1 of the other side.  He reports no history of trauma.  In the past he has opted to treat gout on as-needed basis..Original episode affected the left first MTP joint. his joint stiffness is ongoing and his joint swelling is improved.  Limitation on activities include none. The patient is not avoiding high purine foods. Alcoholic as noted. Limitation on activities as noted in the MDHAQ scanned in the EMR.  Further historical information, including ROS as noted in the multidimensional health assessment questionnaire scanned in the EMR and in the assessment and plan section.  No other joint areas and upper extremities and chest, neck or back affected.  He has not observed any tophi.       ALLERGIES:Venom-honey bee    PAST MEDICAL/ACTIVE PROBLEMS/MEDICATION/SOCIAL DATA  Past Medical History:   Diagnosis Date     Depression      History   Smoking Status     Former Smoker     Quit date: 1/16/1995   Smokeless Tobacco     Never Used     Patient Active Problem List   Diagnosis     Rosacea     Osteoarthrosis Of The Hip     Allergies     Obesity     Bipolar Disorder     Umbilical Hernia     Seborrheic Keratosis     Chronic Gout     Hypercholesterolemia     Acute idiopathic gout of right elbow     Current Outpatient Prescriptions   Medication Sig Dispense Refill     colchicine (MITIGARE) 0.6 mg cap Take 0.6 mg by mouth 2 (two) times a day. 180 capsule 0     cycloSPORINE (RESTASIS) 0.05 % ophthalmic emulsion Administer 1 drop to both eyes every 12 (twelve) hours. Daily       diclofenac (VOLTAREN) 50 MG EC tablet Take 1 tablet (50 mg total) by mouth 3 (three) times a day. 60 tablet 0     predniSONE (DELTASONE) 20 MG tablet Take 1 tablet (20 mg total) by mouth daily for 15  days. 15 tablet 0     allopurinol (ZYLOPRIM) 100 MG tablet 100 mg daily for 1 week, increase to 200 mg throughout the second week and then 300 mg daily. 90 tablet 1     No current facility-administered medications for this visit.          DETAILED EXAMINATION  06/28/18  :  Vitals:    06/28/18 1447   BP: 128/82   Patient Site: Right Arm   Patient Position: Sitting   Cuff Size: Adult Regular   Pulse: 100   Weight: (!) 229 lb (103.9 kg)     Alert oriented. Head including the face is examined for malar rash, heliotropes, scarring, lupus pernio. Eyes examined for redness such as in episcleritis/scleritis, periorbital lesions.   Neck/ Face examined for parotid gland swelling, range of motion of neck.  Left upper and lower and right upper and lower extremities examined for tenderness, swelling, warmth of the appendicular joints, range of motion, edema, rash.  Some of the important findings included: There is no residual flexion of flexion deformity at his right elbow.  He has acutely tender warm swollen faintly hyperemic left second metatarsophalangeal joint.           LAB / IMAGING DATA:  ALT   Date Value Ref Range Status   12/01/2009 32 <46 IU/L Final     Albumin   Date Value Ref Range Status   12/01/2009 4.3 3.5 - 5.0 g/dL Final     Creatinine   Date Value Ref Range Status   07/05/2017 0.82 0.70 - 1.30 mg/dL Final   05/20/2015 0.77 0.70 - 1.30 mg/dL Final   05/13/2014 0.74 0.70 - 1.30 mg/dL Final       WBC   Date Value Ref Range Status   05/20/2015 4.8 4.0 - 11.0 thou/uL Final   03/27/2014 8.6 4.0 - 11.0 thou/uL Final   05/07/2013 5.3 4.0 - 11.0 thou/uL Final     Hemoglobin   Date Value Ref Range Status   05/20/2015 16.7 14.0 - 18.0 g/dL Final   03/27/2014 15.3 14.0 - 18.0 g/dL Final   05/07/2013 14.8 14.0 - 18.0 g/dL Final     Platelets   Date Value Ref Range Status   05/20/2015 209 140 - 440 thou/uL Final   03/27/2014 213 140 - 440 thou/uL Final   05/07/2013 194 140 - 440 thou/uL Final       No results found for:  BENI

## 2021-06-20 NOTE — PROGRESS NOTES
"    ASSESSMENT AND PLAN Low Waller 58 y.o. male is here for follow-up.  He now has a crystal proven gout.  He has tolerated allopurinol nicely and 300 mg daily his serum uric acid is better but not in range.  This is going to be increased to 450 mg daily.  He has not had a rash, fever.  His recent labs are reviewed within acceptable range otherwise.  He has been able to tolerate colchicine.  He is to return for follow-up.  At this time in 3 months with labs just prior.    Diagnoses and all orders for this visit:    Chronic Gout  -     colchicine (MITIGARE) 0.6 mg cap; Take 0.6 mg by mouth 2 (two) times a day.  Dispense: 180 capsule; Refill: 0  -     allopurinol (ZYLOPRIM) 300 MG tablet; Take 1.5 tablets (450 mg total) by mouth daily.  Dispense: 135 tablet; Refill: 0             HISTORY OF PRESENTING ILLNESS:  Low Waller 58 y.o. is here for follow-up of gout, crystal proven.  He has tolerated allopurinol colchicine combination.  He noted pain level 0.5/10.  He had 2 \"small\" flare of legs inflammation of the feet, responding to one pill of prednisone.  He recently had his labs drawn.  His CMV or is 6.9.  This is significant improvement.  However he is not quite at the target yet.  He noted no limitation in day-to-day activity.  There is no stiffness in the morning.  He has not had fever or weight loss blurry vision rash cough nausea mouth ulcer or eye redness. At that point he had a right elbow aspirated demonstrating MSU crystals.  His gout has flared up.  This time it is in the left second toe.  He took prednisone 3 doses which helped him significantly.  He is able to ambulate.  He rates the pain at the peak at 2.5/10.  Between the episode in the elbow in April and now he has not had any significant symptoms.  He was somewhat surprised to see the cost of colchicine having gone up so much compared to what it used to be when he took it previously.  He rated the pain and that was moderately severe " interfering with some of the day-to-day activities.  There is no morning stiffness.  He has not had a rash.  No history of trauma.    His gout typically affects the lower extremities especially feet 1 of the other side.  He reports no history of trauma.  In the past he has opted to treat gout on as-needed basis..Original episode affected the left first MTP joint. his joint stiffness is ongoing and his joint swelling is improved.  Limitation on activities include none. The patient is not avoiding high purine foods. Alcoholic as noted. Limitation on activities as noted in the MDHAQ scanned in the EMR.  Further historical information, including ROS as noted in the multidimensional health assessment questionnaire scanned in the EMR and in the assessment and plan section.  No other joint areas and upper extremities and chest, neck or back affected.  He has not observed any tophi.       ALLERGIES:Venom-honey bee    PAST MEDICAL/ACTIVE PROBLEMS/MEDICATION/SOCIAL DATA  Past Medical History:   Diagnosis Date     Depression      History   Smoking Status     Former Smoker     Quit date: 1/16/1995   Smokeless Tobacco     Never Used     Patient Active Problem List   Diagnosis     Rosacea     Osteoarthrosis Of The Hip     Allergies     Obesity     Bipolar Disorder     Umbilical Hernia     Seborrheic Keratosis     Chronic Gout     Hypercholesterolemia     Acute idiopathic gout of right elbow     Current Outpatient Prescriptions   Medication Sig Dispense Refill     allopurinol (ZYLOPRIM) 100 MG tablet 100 mg daily for 1 week, increase to 200 mg throughout the second week and then 300 mg daily. 90 tablet 1     colchicine (MITIGARE) 0.6 mg cap Take 0.6 mg by mouth 2 (two) times a day. 180 capsule 0     cycloSPORINE (RESTASIS) 0.05 % ophthalmic emulsion Administer 1 drop to both eyes every 12 (twelve) hours. Daily       No current facility-administered medications for this visit.          DETAILED EXAMINATION  08/28/18  :  Vitals:     08/28/18 0757   BP: 112/80   Patient Site: Right Arm   Patient Position: Sitting   Cuff Size: Adult Large   Pulse: 88   Weight: (!) 240 lb (108.9 kg)     Alert oriented. Head including the face is examined for malar rash, heliotropes, scarring, lupus pernio. Eyes examined for redness such as in episcleritis/scleritis, periorbital lesions.   Neck/ Face examined for parotid gland swelling, range of motion of neck.  Left upper and lower and right upper and lower extremities examined for tenderness, swelling, warmth of the appendicular joints, range of motion, edema, rash.  Some of the important findings included: There is no acutely inflamed joints, except palpable upper extremity lower extremity joints.  No tophi.        LAB / IMAGING DATA:  ALT   Date Value Ref Range Status   08/24/2018 45 0 - 45 U/L Final   12/01/2009 32 <46 IU/L Final     Albumin   Date Value Ref Range Status   08/24/2018 4.5 3.5 - 5.0 g/dL Final   12/01/2009 4.3 3.5 - 5.0 g/dL Final     Creatinine   Date Value Ref Range Status   08/24/2018 0.78 0.70 - 1.30 mg/dL Final   07/05/2017 0.82 0.70 - 1.30 mg/dL Final   05/20/2015 0.77 0.70 - 1.30 mg/dL Final       WBC   Date Value Ref Range Status   08/24/2018 7.0 4.0 - 11.0 thou/uL Final   05/20/2015 4.8 4.0 - 11.0 thou/uL Final   03/27/2014 8.6 4.0 - 11.0 thou/uL Final     Hemoglobin   Date Value Ref Range Status   08/24/2018 16.6 14.0 - 18.0 g/dL Final   05/20/2015 16.7 14.0 - 18.0 g/dL Final   03/27/2014 15.3 14.0 - 18.0 g/dL Final     Platelets   Date Value Ref Range Status   08/24/2018 243 140 - 440 thou/uL Final   05/20/2015 209 140 - 440 thou/uL Final   03/27/2014 213 140 - 440 thou/uL Final       No results found for: BENI

## 2021-06-21 NOTE — PROGRESS NOTES
"    ASSESSMENT AND PLAN Low Waller 58 y.o. male is here for follow-up of crystal proven gout, had a flareup recently after a long time for which she took prednisone, serum uric acid is improving most recently 5.1, ALT has risen, he is on 450 mg of allopurinol and typically takes 1 colchicine a day at 0.6 mg daily in part because of the $300 per month charge.  We had a detailed discussion around various aspects.  Of the various options available the following plan is obtained.  He will continue allopurinol as now, monitor ALT check labs early at this time in 4 weeks, may take colchicine even on alternate days, he is aware that the main reason for this is to reduce the severity and the frequency of this stage.  We will meet here in the 3 months or sooner.          Diagnoses and all orders for this visit:    Chronic Gout  -     allopurinol (ZYLOPRIM) 300 MG tablet; Take 1.5 tablets (450 mg total) by mouth daily.  Dispense: 135 tablet; Refill: 0    ALT (SGPT) level raised             HISTORY OF PRESENTING ILLNESS:  Low Waller 58 y.o. is here for follow-up of gout, crystal proven.  He has tolerated allopurinol colchicine combination.  He noted pain level 0.5/10.  He had a significant flareup.  This was in his right second toe.  It started on the \"bottom\" of the foot to his surprise.  It took prednisone several days worth to subside, but now back to baseline normal self, asymptomatic.  Recent urate is 5.1 which is 1 of the best numbers.  ALT is mildly elevated.  He is trying to lose weight and his niece and himself are joined the [facility] where he is headed now after this visit.  He has not had fever or weight loss blurry vision rash cough nausea mouth ulcer or eye redness.     His gout typically affects the lower extremities especially feet 1 of the other side.  He reports no history of trauma.  In the past he has opted to treat gout on as-needed basis..Original episode affected the left first MTP joint. his " joint stiffness is ongoing and his joint swelling is improved.  Limitation on activities include none. The patient is not avoiding high purine foods. Alcoholic as noted. Limitation on activities as noted in the MDHAQ scanned in the EMR.  Further historical information, including ROS as noted in the multidimensional health assessment questionnaire scanned in the EMR and in the assessment and plan section.  No other joint areas and upper extremities and chest, neck or back affected.  He has not observed any tophi.       ALLERGIES:Venom-honey bee    PAST MEDICAL/ACTIVE PROBLEMS/MEDICATION/SOCIAL DATA  Past Medical History:   Diagnosis Date     Depression      Social History     Tobacco Use   Smoking Status Former Smoker     Last attempt to quit: 1995     Years since quittin.8   Smokeless Tobacco Never Used     Patient Active Problem List   Diagnosis     Rosacea     Osteoarthrosis Of The Hip     Allergies     Obesity     Bipolar Disorder     Umbilical Hernia     Seborrheic Keratosis     Chronic Gout     Hypercholesterolemia     Acute idiopathic gout of right elbow     Current Outpatient Medications   Medication Sig Dispense Refill     allopurinol (ZYLOPRIM) 300 MG tablet Take 1.5 tablets (450 mg total) by mouth daily. 135 tablet 0     colchicine (MITIGARE) 0.6 mg cap Take 0.6 mg by mouth 2 (two) times a day. 180 capsule 0     cycloSPORINE (RESTASIS) 0.05 % ophthalmic emulsion Administer 1 drop to both eyes every 12 (twelve) hours. Daily       No current facility-administered medications for this visit.          DETAILED EXAMINATION  18  :  Vitals:    18 0744   BP: 128/76   Resp: 16   Weight: (!) 245 lb (111.1 kg)     Alert oriented. Head including the face is examined for malar rash, heliotropes, scarring, lupus pernio. Eyes examined for redness such as in episcleritis/scleritis, periorbital lesions.   Neck/ Face examined for parotid gland swelling, range of motion of neck.  Left upper and lower  and right upper and lower extremities examined for tenderness, swelling, warmth of the appendicular joints, range of motion, edema, rash.  Some of the important findings included: He does not have acutely inflamed joint in upper and lower extremities.  There are no tophi visible.          LAB / IMAGING DATA:  ALT   Date Value Ref Range Status   11/23/2018 64 (H) 0 - 45 U/L Final   08/24/2018 45 0 - 45 U/L Final   12/01/2009 32 <46 IU/L Final     Albumin   Date Value Ref Range Status   11/23/2018 4.0 3.5 - 5.0 g/dL Final   08/24/2018 4.5 3.5 - 5.0 g/dL Final   12/01/2009 4.3 3.5 - 5.0 g/dL Final     Creatinine   Date Value Ref Range Status   11/23/2018 0.74 0.70 - 1.30 mg/dL Final   08/24/2018 0.78 0.70 - 1.30 mg/dL Final   07/05/2017 0.82 0.70 - 1.30 mg/dL Final       WBC   Date Value Ref Range Status   11/23/2018 8.8 4.0 - 11.0 thou/uL Final   08/24/2018 7.0 4.0 - 11.0 thou/uL Final   05/20/2015 4.8 4.0 - 11.0 thou/uL Final     Hemoglobin   Date Value Ref Range Status   11/23/2018 16.1 14.0 - 18.0 g/dL Final   08/24/2018 16.6 14.0 - 18.0 g/dL Final   05/20/2015 16.7 14.0 - 18.0 g/dL Final     Platelets   Date Value Ref Range Status   11/23/2018 227 140 - 440 thou/uL Final   08/24/2018 243 140 - 440 thou/uL Final   05/20/2015 209 140 - 440 thou/uL Final       No results found for: BENI

## 2021-06-24 NOTE — PROGRESS NOTES
ASSESSMENT AND PLAN Low Waller 58 y.o. male is here for follow-up of crystal proven gout.  His most recent serum urate is 6.1, despite 450 mg of allopurinol that he has been taking regularly no longer on colchicine.  Between the option of increasing allopurinol and staying the course he would prefer the latter.  Mild liver function abnormality which is improving.  Will check labs here in 3 months, and then again 3 months thereafter and meet here in 6 months.              Diagnoses and all orders for this visit:    Chronic Gout  -     allopurinol (ZYLOPRIM) 300 MG tablet  Dispense: 135 tablet; Refill: 0    ALT (SGPT) level raised             HISTORY OF PRESENTING ILLNESS:  Low Waller 58 y.o. is here for follow-up of gout, crystal proven.  He has tolerated allopurinol combination.  He is no longer on colchicine.  He noted his pain level to be 0/10.  He has not had a flareup.  His serum urate is 6.1.  To be better than that.  It was lower.  He has not had any change in his diet, weight, and is more exercising now.  Takes allopurinol regularly on almost all days.   .  ALT is mildly elevated.  He is trying to lose weight and his niece and himself are joined the [facility] where he is headed now after this visit.  He has not had fever or weight loss blurry vision rash cough nausea mouth ulcer or eye redness.     His gout typically affects the lower extremities especially feet 1 of the other side.  He reports no history of trauma.  In the past he has opted to treat gout on as-needed basis..Original episode affected the left first MTP joint. his joint stiffness is ongoing and his joint swelling is improved.  Limitation on activities include none. The patient is not avoiding high purine foods. Alcoholic as noted. Limitation on activities as noted in the MDHAQ scanned in the EMR.  Further historical information, including ROS as noted in the multidimensional health assessment questionnaire scanned in the EMR and  in the assessment and plan section.  No other joint areas and upper extremities and chest, neck or back affected.  He has not observed any tophi.       ALLERGIES:Venom-honey bee    PAST MEDICAL/ACTIVE PROBLEMS/MEDICATION/SOCIAL DATA  Past Medical History:   Diagnosis Date     Depression      Social History     Tobacco Use   Smoking Status Former Smoker     Last attempt to quit: 1995     Years since quittin.1   Smokeless Tobacco Never Used     Patient Active Problem List   Diagnosis     Rosacea     Osteoarthrosis Of The Hip     Allergies     Obesity     Bipolar Disorder     Umbilical Hernia     Seborrheic Keratosis     Chronic Gout     Hypercholesterolemia     Acute idiopathic gout of right elbow     ALT (SGPT) level raised     Current Outpatient Medications   Medication Sig Dispense Refill     cycloSPORINE (RESTASIS) 0.05 % ophthalmic emulsion Administer 1 drop to both eyes every 12 (twelve) hours. Daily       allopurinol (ZYLOPRIM) 300 MG tablet Take 1.5 tablets (450 mg total) by mouth daily. 135 tablet 0     colchicine (MITIGARE) 0.6 mg cap Take 0.6 mg by mouth 2 (two) times a day. 180 capsule 0     No current facility-administered medications for this visit.          DETAILED EXAMINATION  19  :  Vitals:    19 0809   BP: 122/88   Patient Site: Right Arm   Patient Position: Sitting   Cuff Size: Adult Large   Pulse: 84   Weight: (!) 245 lb (111.1 kg)     Alert oriented. Head including the face is examined for malar rash, heliotropes, scarring, lupus pernio. Eyes examined for redness such as in episcleritis/scleritis, periorbital lesions.   Neck/ Face examined for parotid gland swelling, range of motion of neck.  Left upper and lower and right upper and lower extremities examined for tenderness, swelling, warmth of the appendicular joints, range of motion, edema, rash.  Some of the important findings included: He does not have acutely inflamed joint in upper and lower extremities.  There are no  tophi visible.          LAB / IMAGING DATA:  ALT   Date Value Ref Range Status   02/27/2019 46 (H) 0 - 45 U/L Final   12/27/2018 64 (H) 0 - 45 U/L Final   11/23/2018 64 (H) 0 - 45 U/L Final     Albumin   Date Value Ref Range Status   02/27/2019 4.1 3.5 - 5.0 g/dL Final   11/23/2018 4.0 3.5 - 5.0 g/dL Final   08/24/2018 4.5 3.5 - 5.0 g/dL Final     Creatinine   Date Value Ref Range Status   02/27/2019 0.84 0.70 - 1.30 mg/dL Final   11/23/2018 0.74 0.70 - 1.30 mg/dL Final   08/24/2018 0.78 0.70 - 1.30 mg/dL Final       WBC   Date Value Ref Range Status   02/27/2019 6.2 4.0 - 11.0 thou/uL Final   11/23/2018 8.8 4.0 - 11.0 thou/uL Final   05/20/2015 4.8 4.0 - 11.0 thou/uL Final     Hemoglobin   Date Value Ref Range Status   02/27/2019 15.9 14.0 - 18.0 g/dL Final   11/23/2018 16.1 14.0 - 18.0 g/dL Final   08/24/2018 16.6 14.0 - 18.0 g/dL Final     Platelets   Date Value Ref Range Status   02/27/2019 223 140 - 440 thou/uL Final   11/23/2018 227 140 - 440 thou/uL Final   08/24/2018 243 140 - 440 thou/uL Final       No results found for: BENI

## 2021-07-31 ENCOUNTER — HEALTH MAINTENANCE LETTER (OUTPATIENT)
Age: 61
End: 2021-07-31

## 2021-09-10 ENCOUNTER — LAB (OUTPATIENT)
Dept: LAB | Facility: CLINIC | Age: 61
End: 2021-09-10
Payer: COMMERCIAL

## 2021-09-10 DIAGNOSIS — M1A.00X0 CHRONIC GOUTY ARTHROPATHY: ICD-10-CM

## 2021-09-10 LAB
ALBUMIN SERPL-MCNC: 3.9 G/DL (ref 3.5–5)
ALT SERPL W P-5'-P-CCNC: 28 U/L (ref 0–45)
CREAT SERPL-MCNC: 0.86 MG/DL (ref 0.7–1.3)
ERYTHROCYTE [DISTWIDTH] IN BLOOD BY AUTOMATED COUNT: 14.1 % (ref 10–15)
GFR SERPL CREATININE-BSD FRML MDRD: >90 ML/MIN/1.73M2
HCT VFR BLD AUTO: 46.8 % (ref 40–53)
HGB BLD-MCNC: 15.6 G/DL (ref 13.3–17.7)
MCH RBC QN AUTO: 30.5 PG (ref 26.5–33)
MCHC RBC AUTO-ENTMCNC: 33.3 G/DL (ref 31.5–36.5)
MCV RBC AUTO: 92 FL (ref 78–100)
PLATELET # BLD AUTO: 294 10E3/UL (ref 150–450)
RBC # BLD AUTO: 5.11 10E6/UL (ref 4.4–5.9)
URATE SERPL-MCNC: 4.7 MG/DL (ref 3–8)
WBC # BLD AUTO: 10.6 10E3/UL (ref 4–11)

## 2021-09-10 PROCEDURE — 36415 COLL VENOUS BLD VENIPUNCTURE: CPT

## 2021-09-10 PROCEDURE — 84460 ALANINE AMINO (ALT) (SGPT): CPT

## 2021-09-10 PROCEDURE — 84550 ASSAY OF BLOOD/URIC ACID: CPT

## 2021-09-10 PROCEDURE — 82040 ASSAY OF SERUM ALBUMIN: CPT

## 2021-09-10 PROCEDURE — 85027 COMPLETE CBC AUTOMATED: CPT

## 2021-09-10 PROCEDURE — 82565 ASSAY OF CREATININE: CPT

## 2021-09-25 ENCOUNTER — HEALTH MAINTENANCE LETTER (OUTPATIENT)
Age: 61
End: 2021-09-25

## 2021-10-04 ENCOUNTER — TRANSFERRED RECORDS (OUTPATIENT)
Dept: HEALTH INFORMATION MANAGEMENT | Facility: CLINIC | Age: 61
End: 2021-10-04

## 2021-11-11 ENCOUNTER — OFFICE VISIT (OUTPATIENT)
Dept: FAMILY MEDICINE | Facility: CLINIC | Age: 61
End: 2021-11-11
Payer: COMMERCIAL

## 2021-11-11 VITALS
BODY MASS INDEX: 36.29 KG/M2 | DIASTOLIC BLOOD PRESSURE: 96 MMHG | SYSTOLIC BLOOD PRESSURE: 152 MMHG | WEIGHT: 245 LBS | HEIGHT: 69 IN | HEART RATE: 101 BPM | OXYGEN SATURATION: 96 %

## 2021-11-11 DIAGNOSIS — Z00.00 ROUTINE PHYSICAL EXAMINATION: Primary | ICD-10-CM

## 2021-11-11 DIAGNOSIS — E78.00 HYPERCHOLESTEROLEMIA: ICD-10-CM

## 2021-11-11 DIAGNOSIS — L81.9 ATYPICAL PIGMENTED SKIN LESION: ICD-10-CM

## 2021-11-11 DIAGNOSIS — R73.01 IMPAIRED FASTING GLUCOSE: ICD-10-CM

## 2021-11-11 DIAGNOSIS — E66.812 CLASS 2 OBESITY DUE TO EXCESS CALORIES WITHOUT SERIOUS COMORBIDITY WITH BODY MASS INDEX (BMI) OF 36.0 TO 36.9 IN ADULT: ICD-10-CM

## 2021-11-11 DIAGNOSIS — E66.09 CLASS 2 OBESITY DUE TO EXCESS CALORIES WITHOUT SERIOUS COMORBIDITY WITH BODY MASS INDEX (BMI) OF 36.0 TO 36.9 IN ADULT: ICD-10-CM

## 2021-11-11 DIAGNOSIS — R03.0 ELEVATED BLOOD PRESSURE READING WITHOUT DIAGNOSIS OF HYPERTENSION: ICD-10-CM

## 2021-11-11 DIAGNOSIS — L71.8 OCULAR ROSACEA: ICD-10-CM

## 2021-11-11 DIAGNOSIS — F30.9 BIPOLAR I DISORDER, SINGLE MANIC EPISODE (H): ICD-10-CM

## 2021-11-11 DIAGNOSIS — M1A.00X0 CHRONIC GOUTY ARTHROPATHY: ICD-10-CM

## 2021-11-11 DIAGNOSIS — Z12.11 COLON CANCER SCREENING: ICD-10-CM

## 2021-11-11 DIAGNOSIS — Z23 HIGH PRIORITY FOR 2019-NCOV VACCINE: ICD-10-CM

## 2021-11-11 PROCEDURE — 99396 PREV VISIT EST AGE 40-64: CPT | Mod: 25 | Performed by: FAMILY MEDICINE

## 2021-11-11 PROCEDURE — 0064A COVID-19,PF,MODERNA (18+ YRS BOOSTER .25ML): CPT | Performed by: FAMILY MEDICINE

## 2021-11-11 PROCEDURE — 91306 COVID-19,PF,MODERNA (18+ YRS BOOSTER .25ML): CPT | Performed by: FAMILY MEDICINE

## 2021-11-11 ASSESSMENT — MIFFLIN-ST. JEOR: SCORE: 1893.75

## 2021-11-11 NOTE — PROGRESS NOTES
Answers for HPI/ROS submitted by the patient on 11/11/2021  Frequency of exercise:: 1 day/week  Getting at least 3 servings of Calcium per day:: Yes  Diet:: Regular (no restrictions)  Taking medications regularly:: Yes  Bi-annual eye exam:: Yes  Dental care twice a year:: Yes  Sleep apnea or symptoms of sleep apnea:: Daytime drowsiness, Excessive snoring  Additional concerns today:: Yes  Duration of exercise:: Less than 15 minutes

## 2021-11-11 NOTE — PROGRESS NOTES
Assessment/Plan:     Routine physical examination  Routine healthcare maintenance.  Preventative cares reviewed.  Future labs for screening PSA etc. as noted at upcoming lab draw around March 2022 with Dr. Lazo's office.  Annual physical exams to continue.  - Prostate Specific Antigen Screen    Elevated blood pressure reading without diagnosis of hypertension  Blood pressure elevation 152/96 on recheck today.  Will ensure stable renal function on follow-up lab testing as noted above.  Patient will monitor for goal less than 130/80 ideally consider antihypertensive medication of persistent elevation at follow-up no later than 6 months.  - Basic metabolic panel    Bipolar I disorder, single manic episode (H)  Bipolar disorder.  Off medication.  Doing well currently.  Has learned how to manage it.  States 1984 was obvious with delusional behavior etc. no recent issues.    Chronic gouty arthropathy  Followed by Dr. Lazo.  Allopurinol 300 mg daily.  Recent uric acid level 4.7.  Denies recent acute gouty arthritis.    Hypercholesterolemia  Check lipid cascade at future fasting lab only visit.  Weight goal less than 230 pounds initially, less than 220 pounds ideally stating that he is already lost nearly 15 pounds from a month ago by caloric restriction.  - Lipid panel reflex to direct LDL Fasting    Class 2 obesity due to excess calories without serious comorbidity with body mass index (BMI) of 36.0 to 36.9 in adult  Dietary and exercise modification as noted above with weight goal less than 230 pounds initially, less than 220 pounds ideally.    Impaired fasting glucose  Impaired fasting glucose history and will check A1c at follow-up with fasting glucose.  Therapeutic lifestyle changes as noted above.  - Basic metabolic panel  - Hemoglobin A1c    Ocular rosacea  Ocular rosacea.  Continues Restasis drops.  Follows with ophthalmology.    Atypical pigmented skin lesion  Has had precancerous lesions described in the  past with atypical pigmented skin lesions described and would like skin exam through dermatology with referral placed.  - Adult Dermatology Referral    Colon cancer screening  Colonoscopy to be updated with prior colonoscopy age 49 told to repeat at 10-year interval.  - Adult Gastro Ref - Procedure Only    High priority for 2019-nCoV vaccine  Moderna COVID-19 vaccine third shot booster provided.  - COVID-19,PF,MODERNA (18+ Yrs BOOSTER .25mL)       I have had an Advance Directives discussion with the patient.  The following high BMI interventions were performed this visit: encouragement to exercise, weight monitoring, weight loss from baseline weight and lifestyle education regarding diet.  Ensure ongoing efforts to achieve weight goal < 230 pounds initially, < 220 pounds ideally.           Subjective:     Low Waller is a 61 year old male who presents for an annual exam.  History of bipolar disorder.  Had issues dating back to 1984 with delusional behavior etc.  No longer on medication and doing well without recent manic episode.  Manages symptoms of depression on his own.  History of chronic gout.  Allopurinol 300 mg daily for uric acid reduction.  Follows with Dr. Lazo rheumatology.  No history of hypertension.  Has had high cholesterol levels however in the past.  Impaired fasting glucose noted previously.  Ocular rosacea managed with Restasis eyedrops.  Pigmented skin lesions with precancerous lesions described and would like to see dermatologist for follow-up.  Needs to update colonoscopy previously completed age 49 and told to repeat at 10-year interval.  Denies recent illness.  Needs Moderna COVID-19 vaccine third shot booster.  Comprehensive review of systems as above otherwise all negative.      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 Flores); 10/31/12 right BRUNA (Dr. Flores)   Volunteer: \"[employer]\" (read with a 10 y.o. for 1 hour every Sunday, but she is painfully shy...)   Raised L[employer] day but left [employer] (believe in God)   PHQ-9 = 2/27 (10/17/12)      Immunization History   Administered Date(s) Administered     COVID-19,PF,Moderna 03/20/2021, 04/17/2021     DT (PEDS <7y) 09/12/2000     Flu, Unspecified 12/01/2019     Influenza Vaccine IM > 6 months Valent IIV4 (Alfuria,Fluzone) 10/03/2021     Influenza Vaccine, 6+MO IM (QUADRIVALENT W/PRESERVATIVES) 12/01/2009     TDAP Vaccine (Boostrix) 05/08/2007, 05/31/2017     Tdap (Adacel,Boostrix) 05/08/2007, 05/31/2017     Immunization status: Moderna COVID-19 third shot booster provided today    Current Outpatient Medications   Medication Sig Dispense Refill     ALLOPURINOL PO Take 300 mg by mouth daily.         cycloSPORINE (RESTASIS) 0.05 % ophthalmic emulsion Place 1 drop into both eyes.         Past Medical History:   Diagnosis Date     Bipolar affective (H)      Chronic gout      Depression      Hyperlipidemia      Rosacea      Syncope     march 2012 work up done, no " reason discovered     Past Surgical History:   Procedure Laterality Date     ARTHROPLASTY HIP  10/31/2012    Procedure: ARTHROPLASTY HIP;  RIGHT TOTAL HIP ARTHROPLASTY (SMITH & NEPHEW OXINIUM);  Surgeon: Adama Flores MD;  Location: [facility] OR     ARTHROPLASTY REVISION HIP  10/31/2012    Procedure: ARTHROPLASTY REVISION HIP;  REVISION RIGHT HIP - ACETABULAR COMPONENT and femoral head- MARY & NEPHEW;  Surgeon: Adama Flores MD;  Location: [facility] OR     CATARACT EXTRACTION Bilateral      DERMABRASION FACE       ENT SURGERY      tonsillectomy     EYE SURGERY      florin cataracts     JOINT REPLACEMENT Bilateral     hip     ORTHOPEDIC SURGERY      (L) hip resurfacing     TONSILLECTOMY       Bee venom  Family History   Problem Relation Age of Onset     Dementia Mother      Cerebrovascular Disease Mother      Cerebrovascular Disease Father      Social History     Socioeconomic History     Marital status:      Spouse name: Not on file     Number of children: Not on file     Years of education: Not on file     Highest education level: Not on file   Occupational History     Not on file   Tobacco Use     Smoking status: Former Smoker     Packs/day: 0.50     Years: 18.00     Pack years: 9.00     Quit date: 1995     Years since quittin.8     Smokeless tobacco: Never Used     Tobacco comment: quit in    Substance and Sexual Activity     Alcohol use: Yes     Alcohol/week: 1.7 standard drinks     Comment: Alcoholic Drinks/day: occasionally      Drug use: No     Sexual activity: Not on file   Other Topics Concern     Not on file   Social History Narrative     Not on file     Social Determinants of Health     Financial Resource Strain: Not on file   Food Insecurity: Not on file   Transportation Needs: Not on file   Physical Activity: Not on file   Stress: Not on file   Social Connections: Not on file   Intimate Partner Violence: Not on file   Housing Stability: Not on file       Review of  "Systems  Comprehensive ROS: as above, otherwise all negative.           Objective:     BP (!) 152/96   Pulse 101   Ht 1.74 m (5' 8.5\")   Wt 111.1 kg (245 lb)   SpO2 96%   BMI 36.71 kg/m    Body mass index is 36.71 kg/m .    Physical    General Appearance:    Alert, cooperative, no distress, appears stated age.  BMI 36.71   Head:    Normocephalic, without obvious abnormality, atraumatic   Eyes:    PERRL, conjunctiva with injection, EOM's intact, fundi     benign, both eyes        Ears:    Normal TM's and external ear canals, both ears   Nose:   Nares normal, septum midline, mucosa normal, no drainage    or sinus tenderness   Throat:   Lips, mucosa, and tongue normal; teeth and gums normal   Neck:   Supple, symmetrical, trachea midline, no adenopathy;        thyroid:  No enlargement/tenderness/nodules; no carotid    bruit or JVD   Back:     Symmetric, no curvature, ROM normal, no CVA tenderness   Lungs:     Clear to auscultation bilaterally, respirations unlabored   Chest wall:    No tenderness or deformity   Heart:    Regular rate and rhythm, S1 and S2 normal, no murmur, rub   or gallop   Abdomen:     Soft, non-tender, bowel sounds active all four quadrants,     no masses, no organomegaly.     Genitalia:    Normal male without lesion, discharge or tenderness.  No inguinal hernia noted.     Rectal:    Normal tone.  Prostate normal/symmetric, no masses or tenderness.   Extremities:   Extremities normal, atraumatic, no cyanosis or edema   Pulses:   2+ and symmetric all extremities   Skin:   Skin color, texture, turgor normal, no rashes.  Multiple pigmented skin lesions.   Lymph nodes:   Cervical, supraclavicular, and axillary nodes normal   Neurologic:   CNII-XII intact. Normal strength, sensation and reflexes       throughout                This note has been dictated using voice recognition software and as a result may contain minor grammatical errors and unintended word substitutions.   "

## 2021-12-02 ENCOUNTER — TRANSFERRED RECORDS (OUTPATIENT)
Dept: HEALTH INFORMATION MANAGEMENT | Facility: CLINIC | Age: 61
End: 2021-12-02
Payer: COMMERCIAL

## 2022-01-05 ENCOUNTER — TELEPHONE (OUTPATIENT)
Dept: DERMATOLOGY | Facility: CLINIC | Age: 62
End: 2022-01-05
Payer: COMMERCIAL

## 2022-01-05 NOTE — TELEPHONE ENCOUNTER
[facility] Call Center    Phone Message    May a detailed message be left on voicemail: yes     Reason for Call: Other: `      Pt wants to know how Skin Checks are performed.     Please call Pt to advise. Thanks!    Action Taken: Message routed to:  [facility] ([facility]): Derm    Travel Screening: Not Applicable

## 2022-01-05 NOTE — TELEPHONE ENCOUNTER
Pt returned call. Would specifically like to know what technology is used to do skin checks. Please leave detailed message. Curious if they use RCM, or what they think of that tech.

## 2022-01-06 NOTE — TELEPHONE ENCOUNTER
I called and left a detailed voicemail informing Will that for skin checks we use dermatoscopes.    Dafne Keller, YULI

## 2022-01-12 ENCOUNTER — TRANSFERRED RECORDS (OUTPATIENT)
Dept: HEALTH INFORMATION MANAGEMENT | Facility: CLINIC | Age: 62
End: 2022-01-12
Payer: COMMERCIAL

## 2022-02-23 DIAGNOSIS — M1A.00X0 CHRONIC GOUTY ARTHROPATHY: Primary | ICD-10-CM

## 2022-03-11 ENCOUNTER — LAB (OUTPATIENT)
Dept: LAB | Facility: CLINIC | Age: 62
End: 2022-03-11
Payer: COMMERCIAL

## 2022-03-11 DIAGNOSIS — R73.01 IMPAIRED FASTING GLUCOSE: ICD-10-CM

## 2022-03-11 DIAGNOSIS — M1A.00X0 CHRONIC GOUTY ARTHROPATHY: ICD-10-CM

## 2022-03-11 DIAGNOSIS — Z00.00 ROUTINE PHYSICAL EXAMINATION: ICD-10-CM

## 2022-03-11 DIAGNOSIS — R03.0 ELEVATED BLOOD PRESSURE READING WITHOUT DIAGNOSIS OF HYPERTENSION: ICD-10-CM

## 2022-03-11 DIAGNOSIS — E78.00 HYPERCHOLESTEROLEMIA: ICD-10-CM

## 2022-03-11 LAB
ALBUMIN SERPL-MCNC: 4.4 G/DL (ref 3.5–5)
ALT SERPL W P-5'-P-CCNC: 38 U/L (ref 0–45)
ANION GAP SERPL CALCULATED.3IONS-SCNC: 14 MMOL/L (ref 5–18)
BUN SERPL-MCNC: 18 MG/DL (ref 8–22)
CALCIUM SERPL-MCNC: 9.9 MG/DL (ref 8.5–10.5)
CHLORIDE BLD-SCNC: 102 MMOL/L (ref 98–107)
CHOLEST SERPL-MCNC: 259 MG/DL
CO2 SERPL-SCNC: 24 MMOL/L (ref 22–31)
CREAT SERPL-MCNC: 0.81 MG/DL (ref 0.7–1.3)
ERYTHROCYTE [DISTWIDTH] IN BLOOD BY AUTOMATED COUNT: 13.8 % (ref 10–15)
FASTING STATUS PATIENT QL REPORTED: ABNORMAL
GFR SERPL CREATININE-BSD FRML MDRD: >90 ML/MIN/1.73M2
GLUCOSE BLD-MCNC: 104 MG/DL (ref 70–125)
HBA1C MFR BLD: 5.6 % (ref 0–5.6)
HCT VFR BLD AUTO: 47.6 % (ref 40–53)
HDLC SERPL-MCNC: 40 MG/DL
HGB BLD-MCNC: 16.2 G/DL (ref 13.3–17.7)
LDLC SERPL CALC-MCNC: 174 MG/DL
MCH RBC QN AUTO: 30.7 PG (ref 26.5–33)
MCHC RBC AUTO-ENTMCNC: 34 G/DL (ref 31.5–36.5)
MCV RBC AUTO: 90 FL (ref 78–100)
PLATELET # BLD AUTO: 241 10E3/UL (ref 150–450)
POTASSIUM BLD-SCNC: 4.1 MMOL/L (ref 3.5–5)
PSA SERPL-MCNC: 1.74 UG/L (ref 0–4.5)
RBC # BLD AUTO: 5.28 10E6/UL (ref 4.4–5.9)
SODIUM SERPL-SCNC: 140 MMOL/L (ref 136–145)
TRIGL SERPL-MCNC: 224 MG/DL
URATE SERPL-MCNC: 4.9 MG/DL (ref 3–8)
WBC # BLD AUTO: 7.7 10E3/UL (ref 4–11)

## 2022-03-11 PROCEDURE — 80048 BASIC METABOLIC PNL TOTAL CA: CPT

## 2022-03-11 PROCEDURE — 36415 COLL VENOUS BLD VENIPUNCTURE: CPT

## 2022-03-11 PROCEDURE — 85027 COMPLETE CBC AUTOMATED: CPT

## 2022-03-11 PROCEDURE — 84460 ALANINE AMINO (ALT) (SGPT): CPT

## 2022-03-11 PROCEDURE — G0103 PSA SCREENING: HCPCS

## 2022-03-11 PROCEDURE — 84550 ASSAY OF BLOOD/URIC ACID: CPT

## 2022-03-11 PROCEDURE — 82040 ASSAY OF SERUM ALBUMIN: CPT

## 2022-03-11 PROCEDURE — 83036 HEMOGLOBIN GLYCOSYLATED A1C: CPT

## 2022-03-11 PROCEDURE — 80061 LIPID PANEL: CPT

## 2022-03-17 ENCOUNTER — VIRTUAL VISIT (OUTPATIENT)
Dept: RHEUMATOLOGY | Facility: CLINIC | Age: 62
End: 2022-03-17
Payer: COMMERCIAL

## 2022-03-17 DIAGNOSIS — M1A.00X0 CHRONIC GOUTY ARTHROPATHY: Primary | ICD-10-CM

## 2022-03-17 DIAGNOSIS — M15.0 PRIMARY OSTEOARTHRITIS INVOLVING MULTIPLE JOINTS: ICD-10-CM

## 2022-03-17 PROCEDURE — 99214 OFFICE O/P EST MOD 30 MIN: CPT | Mod: GT | Performed by: INTERNAL MEDICINE

## 2022-03-17 RX ORDER — DOXYCYCLINE HYCLATE 100 MG
1 TABLET ORAL DAILY
COMMUNITY
Start: 2022-01-12

## 2022-03-17 RX ORDER — ALLOPURINOL 300 MG/1
600 TABLET ORAL DAILY
Qty: 180 TABLET | Refills: 3 | Status: SHIPPED | OUTPATIENT
Start: 2022-03-17 | End: 2022-06-15

## 2022-03-17 NOTE — PROGRESS NOTES
Will is a 61 year old who is being evaluated via a billable video visit.      How would you like to obtain your AVS? MyChart  If the video visit is dropped, the invitation should be resent by: [email or web address] [phone]  Will anyone else be joining your video visit? No         Video-Visit Details    Type of service:  Video Visit  Start: 03/17/2022 08:07 am  Stop: 03/17/2022 08:18 am    Originating Location (pt. Location): Home    [facility]     Platform used for Video Visit: Paxera     This document was created using a software with less than 100% fidelity, at times resulting in unintended, even erroneous syntax and grammar.  The reader is advised to keep this under consideration while reviewing, interpreting this note.           ASSESSMENT AND PLAN:    Diagnoses and all orders for this visit:  Chronic gouty arthropathy  -     allopurinol (ZYLOPRIM) 300 MG tablet; Take 2 tablets (600 mg) by mouth daily  Primary osteoarthritis involving multiple joints      Follow up in 1 year      HISTORY OF PRESENTING ILLNESS:  Low Waller 61 year old is evaluated here via video/audio link. This is for follow-up of gout, crystal proven.  Over the past year he has done well with regards to gout without flareups, taking allopurinol 600 mg daily serum urate 4.9 that despite some weight gain secondary to Covid related reduced mobility that he is trying to counter.  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  His left shoulder pain took him to orthopedics tendinitis was diagnosed physical therapy corticosteroid injection took care of that.  His wife is a nurse is done well with the breast cancer and has given up hospital job to work at [place] [employer].  He has osteoarthritis.  Occasional knee pain.  Bilateral hip replacement.  We discussed losing weight is important in this context.  He is to stay the course with  allopurinol.  Follow-up in 12 months labs every 6 months.  He is vaccinated and boosted for COVID-19.       ROS enquiry held for fever, ocular symptoms, rash, headache,  GI issues.  Today we also discussed the issues related to the current pandemic, the pros and cons of the current treatment plan, the CDC guidelines such as social distancing washing the hands covering the cough.  ALLERGIES:Meperidine and Bee venom    PAST MEDICAL/ACTIVE PROBLEMS/MEDICATION/SOCIAL DATA  Past Medical History:   Diagnosis Date     Bipolar affective (H)      Chronic gout      Depression      Hyperlipidemia      Rosacea      Syncope     march 2012 work up done, no reason discovered     History   Smoking Status     Former Smoker     Packs/day: 0.50     Years: 18.00     Quit date: 1/16/1995   Smokeless Tobacco     Never Used     Comment: quit in 1995     Patient Active Problem List   Diagnosis     Primary osteoarthritis of right hip     Idiopathic chronic gout of foot without tophus, unspecified laterality     Status post revision of total hip     Elevated blood pressure reading without diagnosis of hypertension     Class 2 obesity due to excess calories without serious comorbidity with body mass index (BMI) of 36.0 to 36.9 in adult     Ocular rosacea     Current Outpatient Medications   Medication Sig Dispense Refill     ALLOPURINOL PO Take 300 mg by mouth daily.         cycloSPORINE (RESTASIS) 0.05 % ophthalmic emulsion Place 1 drop into both eyes.         doxycycline hyclate (VIBRA-TABS) 100 MG tablet Take 1 tablet by mouth 2 times daily           EXAMINATION:    Using the audio and video link as best as possible the constitutional, neck, neurologic, psych, skin, both upper extremities areas/organ system were evaluated during this assessment.  Some of the important findings: Alert, oriented, speech fluent. Able to fully flex the digits, into fists bilaterally, wrist and elbow range of motion appear normal, abduction of the shoulder is  normal.      LAB / IMAGING DATA:  ALT   Date Value Ref Range Status   03/11/2022 38 0 - 45 U/L Final   09/10/2021 28 0 - 45 U/L Final   03/08/2021 36 0 - 45 U/L Final     Albumin   Date Value Ref Range Status   03/11/2022 4.4 3.5 - 5.0 g/dL Final   09/10/2021 3.9 3.5 - 5.0 g/dL Final   03/08/2021 3.9 3.5 - 5.0 g/dL Final       WBC Count   Date Value Ref Range Status   03/11/2022 7.7 4.0 - 11.0 10e3/uL Final   09/10/2021 10.6 4.0 - 11.0 10e3/uL Final     Hemoglobin   Date Value Ref Range Status   03/11/2022 16.2 13.3 - 17.7 g/dL Final   09/10/2021 15.6 13.3 - 17.7 g/dL Final   03/08/2021 15.0 14.0 - 18.0 g/dL Final   11/02/2012 9.1 (L) 13.3 - 17.7 g/dL Final   11/01/2012 9.7 (L) 13.3 - 17.7 g/dL Final   10/31/2012 12.6 (L) 13.3 - 17.7 g/dL Final     Platelet Count   Date Value Ref Range Status   03/11/2022 241 150 - 450 10e3/uL Final   09/10/2021 294 150 - 450 10e3/uL Final   03/08/2021 249 140 - 440 thou/uL Final       No results found for: BENI

## 2022-04-14 ENCOUNTER — OFFICE VISIT (OUTPATIENT)
Dept: DERMATOLOGY | Facility: CLINIC | Age: 62
End: 2022-04-14
Attending: FAMILY MEDICINE
Payer: COMMERCIAL

## 2022-04-14 DIAGNOSIS — D18.01 CHERRY ANGIOMA: ICD-10-CM

## 2022-04-14 DIAGNOSIS — D22.9 MULTIPLE BENIGN NEVI: ICD-10-CM

## 2022-04-14 DIAGNOSIS — L82.1 SEBORRHEIC KERATOSIS: ICD-10-CM

## 2022-04-14 DIAGNOSIS — L71.8 OCULAR ROSACEA: Primary | ICD-10-CM

## 2022-04-14 DIAGNOSIS — L81.9 ATYPICAL PIGMENTED SKIN LESION: ICD-10-CM

## 2022-04-14 PROCEDURE — 99203 OFFICE O/P NEW LOW 30 MIN: CPT | Performed by: PHYSICIAN ASSISTANT

## 2022-04-14 RX ORDER — PREDNISOLONE ACETATE 10 MG/ML
SUSPENSION/ DROPS OPHTHALMIC
COMMUNITY
Start: 2022-04-04 | End: 2022-08-31

## 2022-04-14 RX ORDER — MOXIFLOXACIN 5 MG/ML
SOLUTION/ DROPS OPHTHALMIC
COMMUNITY
Start: 2022-04-04 | End: 2022-08-31

## 2022-04-14 ASSESSMENT — PAIN SCALES - GENERAL: PAINLEVEL: NO PAIN (0)

## 2022-04-14 NOTE — PATIENT INSTRUCTIONS
The ABCDEs of Melanoma    Skin cancer can develop anywhere on the skin. Ask someone for help when checking your skin, especially in hard to see places. If you notice a mole different from others, or that changes, enlarges, itches, or bleeds (even if it is small), you should see a dermatologist.       Sun protective clothing and Resources     Commtimize (www.COVEGA)  Athleta (www.Cutting Edge Information)  FlockOfBirds (www.Knottykart)  Carve Designs (TripOvation) - affordable  Skinz (MOBi-LEARNskinz.com)    Long sleeve - Juanito Cool DRI UPF 50 or [place] PFG UPF 50  Hoodie - [place] PFG UPF 50  Swimshirt/Rash Guard - Flores UPF 50 (on Amazon)  Neck - Outdoor Research Ubertubes (www.outdoorresOfferboxx.com)

## 2022-04-14 NOTE — PROGRESS NOTES
[facility] Dermatology Note  Encounter Date: Apr 14, 2022  Office Visit     Dermatology Problem List:  1. AKs  2. Ocular rosacea  - doxycyline 100mg po BID, erythromycin ointment at bedtime, Moxifloxacin solution, prednisone solution   ____________________________________________    Assessment & Plan:    # Ocular rosacea - managed by optho, gave patient the below option for longer term management, but will defer to optho for now. Does not get facial rosacea at this point. Managed by opthalmology. Recommended he decrease his doxycycline to the submicrobial dose for long term use and increase with flares.    - Recommended he decrease the dose of doxycycline to 50 mg daily. Increase to 2-3x daily for flares only.     # Multiple benign nevi.   - No concerning lesions today  - Monitor for ABCDEs of melanoma   - Continue sun protection - recommend SPF 30 or higher with frequent application   - Return sooner if noticing changing or symptomatic lesions    # Seborrheic keratoses. Cherry angiomas.   Discussed the natural history and benign nature of this lesion. Reassurance provided that no additional treatment is necessary.     Procedures Performed:   None    Follow-up: 1 year(s) in-person, or earlier for new or changing lesions    Staff and Scribe:     Scribe Disclosure:  I, Yeimy Young, am serving as a scribe to document services personally performed by Emilee Villatoro PA-C based on data collection and the provider's statements to me.     Provider Disclosure:   The documentation recorded by the scribe accurately reflects the services I personally performed and the decisions made by me.    All risks, benefits and alternatives were discussed with patient.  Patient is in agreement and understands the assessment and plan.  All questions were answered.    Emilee Villatoro PA-C, MPAS  [facility]: Phone: [phone], Fax: [phone] m  [facility]: Phone: [phone],  Fax: [phone]  [facility]: Phone: [phone], Fax: [phone]  ____________________________________________    CC: Skin Check (Rosacea in right eye and wants an overall skin check )    HPI:  Mr. Low Waller is a(n) 61 year old male who presents today as a new patient for [facility].     Today, the patient denies any particular lesions of concern. He had a few lesions removed from his back in the past but does not believe they were cancerous. No personal history of skin cancer. No family history of Melanoma.     He has a recurrence of rosacea in his R eye. He is now on prednisone drops, moxifloxacin drops, erythromycin at night, and doxycyline 100 mg 1-2x daily (initiated 11/2021 by optho). Wondering if there are any other recommendations from a derm standpoint regarding ocular rosacea. Patient is otherwise feeling well, without additional skin concerns.    Labs Reviewed:  N/A    Physical Exam:  Vitals: There were no vitals taken for this visit.  SKIN: Full skin, which includes the head/face, both arms, chest, back, abdomen,both legs, genitalia and/or groin buttocks, digits and/or nails, was examined.  - Carrillo I  - There are dome shaped bright red papules on the trunk and extremities.   - Multiple regular brown pigmented macules and papules are identified on the trunk and extremities, <100.   - There are waxy stuck on tan to brown papules on the trunk and extremities.   - sclera clear, no injection  - No other lesions of concern on areas examined.     Medications:  Current Outpatient Medications   Medication     allopurinol (ZYLOPRIM) 300 MG tablet     cycloSPORINE (RESTASIS) 0.05 % ophthalmic emulsion     doxycycline hyclate (VIBRA-TABS) 100 MG tablet     moxifloxacin (VIGAMOX) 0.5 % ophthalmic solution     prednisoLONE acetate (PRED FORTE) 1 % ophthalmic suspension     No current facility-administered medications for this  visit.      Past Medical History:   Patient Active Problem List   Diagnosis     Primary osteoarthritis of right hip     Idiopathic chronic gout of foot without tophus, unspecified laterality     Status post revision of total hip     Elevated blood pressure reading without diagnosis of hypertension     Class 2 obesity due to excess calories without serious comorbidity with body mass index (BMI) of 36.0 to 36.9 in adult     Ocular rosacea     Past Medical History:   Diagnosis Date     Bipolar affective (H)      Chronic gout      Depression      Hyperlipidemia      Rosacea      Syncope     march 2012 work up done, no reason discovered        CC Josh Noble MD  [address] [facility] [address]  [facility] [address]  [place] [address] on close of this encounter.

## 2022-04-14 NOTE — LETTER
4/14/2022       RE: Low Waller  [address] [place] [address]     Dear Colleague,    Thank you for referring your patient, Low Waller, to the [facility] [place] at [facility]. Please see a copy of my visit note below.    [facility] Dermatology Note  Encounter Date: Apr 14, 2022  Office Visit     Dermatology Problem List:  1. AKs  2. Ocular rosacea  - doxycyline 100mg po BID, erythromycin ointment at bedtime, Moxifloxacin solution, prednisone solution   ____________________________________________    Assessment & Plan:    # Ocular rosacea - managed by optho, gave patient the below option for longer term management, but will defer to optho for now. Does not get facial rosacea at this point. Managed by opthalmology. Recommended he decrease his doxycycline to the submicrobial dose for long term use and increase with flares.    - Recommended he decrease the dose of doxycycline to 50 mg daily. Increase to 2-3x daily for flares only.     # Multiple benign nevi.   - No concerning lesions today  - Monitor for ABCDEs of melanoma   - Continue sun protection - recommend SPF 30 or higher with frequent application   - Return sooner if noticing changing or symptomatic lesions    # Seborrheic keratoses. Cherry angiomas.   Discussed the natural history and benign nature of this lesion. Reassurance provided that no additional treatment is necessary.     Procedures Performed:   None    Follow-up: 1 year(s) in-person, or earlier for new or changing lesions    Staff and Scribe:     Scribe Disclosure:  I, Yeimy Young, am serving as a scribe to document services personally performed by Emilee Villatoro PA-C based on data collection and the provider's statements to me.     Provider Disclosure:   The documentation recorded by the scribe accurately reflects the services I personally performed and the decisions  made by me.    All risks, benefits and alternatives were discussed with patient.  Patient is in agreement and understands the assessment and plan.  All questions were answered.    Emilee Villatoro PA-C, MPAS  [facility] [place]: Phone: [phone], Fax: [phone]  [facility]: Phone: [phone],  Fax: [phone]  [facility]: Phone: [phone], Fax: [phone]  ____________________________________________    CC: Skin Check (Rosacea in right eye and wants an overall skin check )    HPI:  Mr. Low Waller is a(n) 61 year old male who presents today as a new patient for [facility].     Today, the patient denies any particular lesions of concern. He had a few lesions removed from his back in the past but does not believe they were cancerous. No personal history of skin cancer. No family history of Melanoma.     He has a recurrence of rosacea in his R eye. He is now on prednisone drops, moxifloxacin drops, erythromycin at night, and doxycyline 100 mg 1-2x daily (initiated 11/2021 by optho). Wondering if there are any other recommendations from a derm standpoint regarding ocular rosacea. Patient is otherwise feeling well, without additional skin concerns.    Labs Reviewed:  N/A    Physical Exam:  Vitals: There were no vitals taken for this visit.  SKIN: Full skin, which includes the head/face, both arms, chest, back, abdomen,both legs, genitalia and/or groin buttocks, digits and/or nails, was examined.  - Carrillo I  - There are dome shaped bright red papules on the trunk and extremities.   - Multiple regular brown pigmented macules and papules are identified on the trunk and extremities, <100.   - There are waxy stuck on tan to brown papules on the trunk and extremities.   - sclera clear, no injection  - No other lesions of concern on areas examined.     Medications:  Current Outpatient Medications    Medication     allopurinol (ZYLOPRIM) 300 MG tablet     cycloSPORINE (RESTASIS) 0.05 % ophthalmic emulsion     doxycycline hyclate (VIBRA-TABS) 100 MG tablet     moxifloxacin (VIGAMOX) 0.5 % ophthalmic solution     prednisoLONE acetate (PRED FORTE) 1 % ophthalmic suspension     No current facility-administered medications for this visit.      Past Medical History:   Patient Active Problem List   Diagnosis     Primary osteoarthritis of right hip     Idiopathic chronic gout of foot without tophus, unspecified laterality     Status post revision of total hip     Elevated blood pressure reading without diagnosis of hypertension     Class 2 obesity due to excess calories without serious comorbidity with body mass index (BMI) of 36.0 to 36.9 in adult     Ocular rosacea     Past Medical History:   Diagnosis Date     Bipolar affective (H)      Chronic gout      Depression      Hyperlipidemia      Rosacea      Syncope     march 2012 work up done, no reason discovered        CC Josh Noble MD  [address]  [address]  [place] [address] on close of this encounter.

## 2022-04-14 NOTE — NURSING NOTE
Dermatology Rooming Note    Low Waller's goals for this visit include:   Chief Complaint   Patient presents with     Skin Check     Rosacea in right eye and wants an overall skin check      Laisha Minaya, Visit Facilitator

## 2022-06-16 ENCOUNTER — TRANSFERRED RECORDS (OUTPATIENT)
Dept: HEALTH INFORMATION MANAGEMENT | Facility: CLINIC | Age: 62
End: 2022-06-16
Payer: COMMERCIAL

## 2022-08-11 ENCOUNTER — NURSE TRIAGE (OUTPATIENT)
Dept: NURSING | Facility: CLINIC | Age: 62
End: 2022-08-11

## 2022-08-11 NOTE — TELEPHONE ENCOUNTER
Umbilical hernia for 13 years. Started lifting weights again> Talked with MD. Weights are getting heavier. Wonders if he should come in and talk about it? It always pops out. He pushes it back in and life goes on. He has no complications from doing that. Should he have surgery? I connected with scheduling for an appointment within the next two weeks. I advised him to avoid heavier weights at this time and call back if he worsens, unable to reduce hernia. He understands.  Asya Mccloud RN  [facility]      Reason for Disposition    Previously diagnosed hernia    Additional Information    Negative: Swelling of scrotum and has not previously been diagnosed with a hernia    Negative: SEVERE abdominal pain    Negative: Hernia is painful or tender to touch    Negative: Vomiting and can't reduce the hernia    Negative: Vomiting and abdomen looks much more swollen than usual    Negative: Vomiting and hernia is more painful or swollen than usual    Negative: Swollen lump in groin and pulsating (like heartbeat)    Negative: Patient sounds very sick or weak to the triager    Negative: Constant abdominal pain and present > 2 hours (NO pain or tenderness of hernia)    Negative: Can't reduce the hernia (NO pain, local tenderness, or vomiting)    Negative: Patient wants to be seen    Negative: New-onset hernia suspected (reducible bulge in groin or abdomen; non-tender) and NO pain or vomiting    Protocols used: HERNIA-A-OH

## 2022-08-31 ENCOUNTER — OFFICE VISIT (OUTPATIENT)
Dept: FAMILY MEDICINE | Facility: CLINIC | Age: 62
End: 2022-08-31
Payer: COMMERCIAL

## 2022-08-31 VITALS
DIASTOLIC BLOOD PRESSURE: 88 MMHG | SYSTOLIC BLOOD PRESSURE: 138 MMHG | TEMPERATURE: 97.5 F | OXYGEN SATURATION: 97 % | WEIGHT: 230.9 LBS | BODY MASS INDEX: 34.6 KG/M2 | HEART RATE: 70 BPM

## 2022-08-31 DIAGNOSIS — E78.5 HYPERLIPIDEMIA LDL GOAL <100: ICD-10-CM

## 2022-08-31 DIAGNOSIS — K42.9 UMBILICAL HERNIA WITHOUT OBSTRUCTION AND WITHOUT GANGRENE: Primary | ICD-10-CM

## 2022-08-31 DIAGNOSIS — M1A.00X0 CHRONIC GOUTY ARTHROPATHY: ICD-10-CM

## 2022-08-31 DIAGNOSIS — R03.0 ELEVATED BLOOD PRESSURE READING WITHOUT DIAGNOSIS OF HYPERTENSION: ICD-10-CM

## 2022-08-31 DIAGNOSIS — K40.20 NON-RECURRENT BILATERAL INGUINAL HERNIA WITHOUT OBSTRUCTION OR GANGRENE: ICD-10-CM

## 2022-08-31 DIAGNOSIS — R73.01 IMPAIRED FASTING GLUCOSE: ICD-10-CM

## 2022-08-31 LAB
ALBUMIN SERPL BCG-MCNC: 4.7 G/DL (ref 3.5–5.2)
ALT SERPL W P-5'-P-CCNC: 26 U/L (ref 10–50)
CHOLEST SERPL-MCNC: 252 MG/DL
CREAT SERPL-MCNC: 0.73 MG/DL (ref 0.67–1.17)
ERYTHROCYTE [DISTWIDTH] IN BLOOD BY AUTOMATED COUNT: 13.5 % (ref 10–15)
GFR SERPL CREATININE-BSD FRML MDRD: >90 ML/MIN/1.73M2
GLUCOSE BLD-MCNC: 87 MG/DL (ref 60–99)
HBA1C MFR BLD: 5.2 % (ref 0–5.6)
HCT VFR BLD AUTO: 45.4 % (ref 40–53)
HDLC SERPL-MCNC: 41 MG/DL
HGB BLD-MCNC: 15.9 G/DL (ref 13.3–17.7)
LDLC SERPL CALC-MCNC: 169 MG/DL
MCH RBC QN AUTO: 30.7 PG (ref 26.5–33)
MCHC RBC AUTO-ENTMCNC: 35 G/DL (ref 31.5–36.5)
MCV RBC AUTO: 88 FL (ref 78–100)
NONHDLC SERPL-MCNC: 211 MG/DL
PLATELET # BLD AUTO: 193 10E3/UL (ref 150–450)
RBC # BLD AUTO: 5.18 10E6/UL (ref 4.4–5.9)
TRIGL SERPL-MCNC: 211 MG/DL
WBC # BLD AUTO: 5.8 10E3/UL (ref 4–11)

## 2022-08-31 PROCEDURE — 82947 ASSAY GLUCOSE BLOOD QUANT: CPT | Performed by: FAMILY MEDICINE

## 2022-08-31 PROCEDURE — 82565 ASSAY OF CREATININE: CPT | Performed by: FAMILY MEDICINE

## 2022-08-31 PROCEDURE — 84460 ALANINE AMINO (ALT) (SGPT): CPT | Performed by: FAMILY MEDICINE

## 2022-08-31 PROCEDURE — 36415 COLL VENOUS BLD VENIPUNCTURE: CPT | Performed by: FAMILY MEDICINE

## 2022-08-31 PROCEDURE — 84550 ASSAY OF BLOOD/URIC ACID: CPT | Performed by: FAMILY MEDICINE

## 2022-08-31 PROCEDURE — 85027 COMPLETE CBC AUTOMATED: CPT | Performed by: FAMILY MEDICINE

## 2022-08-31 PROCEDURE — 80061 LIPID PANEL: CPT | Performed by: FAMILY MEDICINE

## 2022-08-31 PROCEDURE — 99214 OFFICE O/P EST MOD 30 MIN: CPT | Performed by: FAMILY MEDICINE

## 2022-08-31 PROCEDURE — 83036 HEMOGLOBIN GLYCOSYLATED A1C: CPT | Performed by: FAMILY MEDICINE

## 2022-08-31 PROCEDURE — 82040 ASSAY OF SERUM ALBUMIN: CPT | Performed by: FAMILY MEDICINE

## 2022-08-31 RX ORDER — ERYTHROMYCIN 5 MG/G
OINTMENT OPHTHALMIC
COMMUNITY
Start: 2022-08-22

## 2022-08-31 RX ORDER — ALLOPURINOL 300 MG/1
TABLET ORAL
COMMUNITY
Start: 2022-07-31 | End: 2023-05-31

## 2022-08-31 ASSESSMENT — PAIN SCALES - GENERAL: PAINLEVEL: NO PAIN (0)

## 2022-08-31 NOTE — PROGRESS NOTES
Assessment/Plan:    Umbilical hernia without obstruction and without gangrene  Moderate to large umbilical hernia, reducible.  General surgery referral to see Dr. Dileep Palomo for definitive repair at patient convenience.  - Adult General Surg Referral    Non-recurrent bilateral inguinal hernia without obstruction or gangrene  Left greater than right inguinal hernia on exam, reducible, small.  Referral as noted above to see Dr. Dileep Palomo for definitive repair  - Adult General Surg Referral    Elevated blood pressure reading without diagnosis of hypertension  Elevated blood pressure with blood pressure 138/88 on recheck and will continue to monitor outside of clinic for goal less than 130/80 with weight goal closer to 220 pounds initially.    Impaired fasting glucose  .  Fasting glucose history.  Check fasting glucose and A1c.  - Glucose whole blood    Hyperlipidemia LDL goal <100  History of hyperlipidemia.  Dietary and exercise modification for weight goal less than 220 pounds ideally  - Lipid panel reflex to direct LDL Fasting    Advanced adenoma of colon  Had colonoscopy January 12, 2022 with 3 tubular adenomas 1 with extensive high-grade dysplasia consistent with advanced adenoma.  Subsequent flex sig without evidence for polyp recurrence and will repeat colonoscopy at 3-year interval.      Subjective:    Low Waller is seen today for umbilical hernia.  Working out.  Has lost over 15 pounds.  Weight training and squats etc.  Also described history of likely groin hernias without obvious concern currently.  Has had elevated blood pressure readings in the past.  Monitors outside of clinic and states closer to 130/80 at home.  History of impaired fasting glucose.  Using allopurinol 300 mg taking 1 or 2 tablets daily and has been followed by Dr. Lazo and needs updated labs completed.  Advanced adenoma of colon reviewed with colonoscopy in January of this year with subsequent flex sig showing no  "evidence for recurrence at polyp location and will repeat colonoscopy at 3-year interval.  Fasting.  Hyperlipidemia.  Comprehensive review of systems as above otherwise all negative.    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 Flores); 10/31/12 right BRUNA (Dr. Flores)   Volunteer: \"[employer]\" (read with a 10 y.o. for 1 hour every Sunday, but she is painfully shy...)   Raised Buddhism but left Rastafarian (believe in God)   PHQ-9 = 2/27 (10/17/12)    Past Surgical History:   Procedure Laterality Date     ARTHROPLASTY HIP  10/31/2012    Procedure: ARTHROPLASTY HIP;  RIGHT TOTAL HIP ARTHROPLASTY (SMITH & NEPHEW OXINIUM);  Surgeon: Adama Flores MD;  Location: [facility] OR     ARTHROPLASTY REVISION HIP  10/31/2012    Procedure: ARTHROPLASTY REVISION HIP;  REVISION RIGHT HIP - ACETABULAR COMPONENT and femoral head- MARY & NEPHEW;  Surgeon: Adama Flores MD;  Location: [facility] OR     CATARACT EXTRACTION Bilateral      DERMABRASION FACE       ENT SURGERY      tonsillectomy     EYE SURGERY      florin cataracts     JOINT REPLACEMENT Bilateral     hip     ORTHOPEDIC SURGERY      (L) hip resurfacing     TONSILLECTOMY          Family History   Problem Relation Age of Onset     Dementia Mother      Cerebrovascular Disease Mother      Cerebrovascular Disease Father         Past Medical History:   Diagnosis Date     Bipolar affective (H)      Chronic gout      " Depression      Hyperlipidemia      Rosacea      Syncope     2012 work up done, no reason discovered        Social History     Tobacco Use     Smoking status: Former Smoker     Packs/day: 0.50     Years: 18.00     Pack years: 9.00     Quit date: 1995     Years since quittin.6     Smokeless tobacco: Never Used     Tobacco comment: quit in    Substance Use Topics     Alcohol use: Yes     Alcohol/week: 1.7 standard drinks     Comment: Alcoholic Drinks/day: occasionally      Drug use: No        Current Outpatient Medications   Medication Sig Dispense Refill     allopurinol (ZYLOPRIM) 300 MG tablet TAKE 2 TABLETS (600 MG) BY MOUTH DAILY       cycloSPORINE (RESTASIS) 0.05 % ophthalmic emulsion Place 1 drop into both eyes       doxycycline hyclate (VIBRA-TABS) 100 MG tablet Take 1 tablet by mouth 2 times daily       erythromycin (ROMYCIN) 5 MG/GM ophthalmic ointment APPLY 0.25 INCH RIBBON TO BOTH EYES EVERY EVENING            Objective:    Vitals:    22 0811 22 0854   BP: (!) 140/90 138/88   BP Location: Left arm    Cuff Size: Adult Large    Pulse: 70    Temp: 97.5  F (36.4  C)    TempSrc: Oral    SpO2: 97%    Weight: 104.7 kg (230 lb 14.4 oz)       Body mass index is 34.6 kg/m .    Alert.  No apparent distress blood pressure 138/88 on recheck.  Chest clear.  Cardiac exam regular.  Moderate to large umbilical hernia, reducible.  Small left greater than right inguinal hernia on exam as well.  Extremities warm and dry.      This note has been dictated using voice recognition software and as a result may contain minor grammatical errors and unintended word substitutions.     Answers for HPI/ROS submitted by the patient on 2022  What is the reason for your visit today? : Concern regarding weight training and umbilical hernia  How many servings of fruits and vegetables do you eat daily?: 4 or more  On average, how many sweetened beverages do you drink each day (Examples: soda, juice, sweet  tea, etc.  Do NOT count diet or artificially sweetened beverages)?: 0  How many minutes a day do you exercise enough to make your heart beat faster?: 60 or more  How many days a week do you exercise enough to make your heart beat faster?: 3 or less  How many days per week do you miss taking your medication?: 2  What makes it hard for you to take your medication every day?: remembering to take

## 2022-09-02 LAB — URATE SERPL-MCNC: 7 MG/DL (ref 3.4–7)

## 2022-09-16 ENCOUNTER — TELEPHONE (OUTPATIENT)
Dept: NURSING | Facility: CLINIC | Age: 62
End: 2022-09-16

## 2022-09-16 NOTE — TELEPHONE ENCOUNTER
"Coronavirus (COVID-19) Notification    Caller Name (Patient, parent, daughter/son, grandparent, etc)  Will    Reason for call  Notify of Positive Coronavirus (COVID-19) lab results, assess symptoms,  review  [employer] recommendations    Lab Result    Lab test:  2019-nCoV rRt-PCR or SARS-CoV-2 PCR    Oropharyngeal AND/OR nasopharyngeal swabs is POSITIVE for 2019-nCoV RNA/SARS-COV-2 PCR (COVID-19 virus)    Brief introduction script  Introduce self then review script:  \"I am calling on behalf of [employer].  We were notified that your Coronavirus test (COVID-19) for was POSITIVE for the virus.\"    Gather patient reported symptoms   Assessment   Current Symptoms at time of phone call, reported by patient: (if no symptoms, document No symptoms] Scratchy throat and cough   Date of Symptom(s) onset (if applicable) 9/12     If at time of call, Patients symptoms hare worsened, the Patient should contact 911 or have someone drive them to Emergency Dept promptly:      If Patient calling 911, inform 911 personal that you have tested positive for the Coronavirus (COVID-19).  Place mask on and await 911 to arrive.    If Emergency Dept, If possible, please have another adult drive you to the Emergency Dept but you need to wear mask when in contact with other people.      Monoclonal Antibody Administration    You may be eligible to receive a new treatment with a monoclonal antibody for preventing hospitalization in patients at high risk for complications from COVID-19. This medication is still experimental and available on a limited basis; it is given through an IV and must be given at an infusion center. Please note that not all people who are eligible will receive the medication since it is in limited supply.  Is the patient symptomatic and onset of symptoms within the last 7 days?  Yes  Is the patient interested in a visit with a provider to discuss treatment options?: No.  Reason patient declined:  Not that sick " and don't think I will get worse (save for people who possibly need it more)    Review information with Patient    Your result was positive. This means you have COVID-19 (coronavirus).      How can I protect others?    These guidelines are for isolating before returning to work, school or .       If you DO have symptoms:  o Stay home and away from others  - For at least 5 days after your symptoms started, AND   - You are fever free for 24 hours (with no medicine that reduces fever), AND  - Your other symptoms are better.  o Wear a mask for 10 full days any time you are around others.    If you DON'T have symptoms:  o Stay at home and away from others for at least 5 days after your positive test.  o Wear a mask for 10 full days any time you are around others.    There may be different guidelines for healthcare facilities. Please check with the specific sites before arriving.     If you've been told by a doctor that you were severely ill with COVID-19 or are immunocompromised, you should isolate for at least 10 days.    You should not go back to work until you meet the guidelines above for ending your home isolation. You don't need to be retested for COVID-19 before going back to work--studies show that you won't spread the virus if it's been at least 10 days since your symptoms started (or 20 days, if you have a weak immune system).    Employers, schools, and daycares: This is an official notice for this person's medical guidelines for returning in-person. They must meet the above guidelines before going back to work, school, or  in person.    You will receive a positive COVID-19 letter via [employer] or the mail soon with additional self-care information.      Would you like me to review some of that information with you now?  No    If you were tested for an upcoming procedure, please contact your provider for next steps.     Laisha Lr RN

## 2022-09-20 NOTE — PATIENT INSTRUCTIONS
Hernia education & surgery packet provided to pt.    [facility]  Surgery Clinic [facility]  [facility] Clinic - [address] [address]  Office: [phone]  Fax: [phone]

## 2022-10-03 ENCOUNTER — OFFICE VISIT (OUTPATIENT)
Dept: SURGERY | Facility: CLINIC | Age: 62
End: 2022-10-03
Attending: FAMILY MEDICINE
Payer: COMMERCIAL

## 2022-10-03 DIAGNOSIS — K42.9 UMBILICAL HERNIA WITHOUT OBSTRUCTION AND WITHOUT GANGRENE: ICD-10-CM

## 2022-10-03 DIAGNOSIS — K40.20 NON-RECURRENT BILATERAL INGUINAL HERNIA WITHOUT OBSTRUCTION OR GANGRENE: ICD-10-CM

## 2022-10-03 PROCEDURE — 99204 OFFICE O/P NEW MOD 45 MIN: CPT | Performed by: SPECIALIST

## 2022-10-03 RX ORDER — ACETAMINOPHEN 325 MG/1
975 TABLET ORAL ONCE
Status: CANCELLED | OUTPATIENT
Start: 2022-10-03 | End: 2022-10-03

## 2022-10-03 NOTE — LETTER
10/3/2022         RE: Low Waller  [address] [place] [address]        Dear Colleague,    Thank you for referring your patient, Low Waller, to the [facility] [place]. Please see a copy of my visit note below.          HPI:  This is a 62 year old male here today with concerns of pain and bulging in his left groin and umbilicus area. He has noted this for the past a a few  years. The symptoms have progressed and increased over this time. He comes in for evaluation secondary to the hernia causing enough issues to bother them with daily activities or chores.    Allergies:Meperidine and Bee venom    Past Medical History:   Diagnosis Date     Bipolar affective (H)      Chronic gout      Depression      Hyperlipidemia      Rosacea      Syncope     march 2012 work up done, no reason discovered       Past Surgical History:   Procedure Laterality Date     ARTHROPLASTY HIP  10/31/2012    Procedure: ARTHROPLASTY HIP;  RIGHT TOTAL HIP ARTHROPLASTY (SMITH & NEPHEW OXINIUM);  Surgeon: Adama Flores MD;  Location: [facility] OR     ARTHROPLASTY REVISION HIP  10/31/2012    Procedure: ARTHROPLASTY REVISION HIP;  REVISION RIGHT HIP - ACETABULAR COMPONENT and femoral head- MARY & NEPHEW;  Surgeon: Adama Flores MD;  Location: [facility] OR     CATARACT EXTRACTION Bilateral      DERMABRASION FACE       ENT SURGERY      tonsillectomy     EYE SURGERY      florin cataracts     JOINT REPLACEMENT Bilateral     hip     ORTHOPEDIC SURGERY      (L) hip resurfacing     TONSILLECTOMY         CURRENT MEDS:      Family History   Problem Relation Age of Onset     Dementia Mother      Cerebrovascular Disease Mother      Cerebrovascular Disease Father         reports that he quit smoking about 27 years ago. He has a 9.00 pack-year smoking history. He has never used smokeless tobacco. He reports current alcohol use of about 1.7 standard drinks of alcohol per  week. He reports that he does not use drugs.    Review of Systems - Negative except pain and umbiliucs when lifting and working out    There were no vitals filed for this visit.    There is no height or weight on file to calculate BMI.    EXAM:  General: NAD   HEENT: normocephalic, PERRLA and EOMS intact  Mounth: Mucus membranes moist  Neck: Supple  Chest: Clear to auscultation bilaterally  CV: RRR  ABD: Soft nontender and nondistended, left inguinal hernia and umbilical hernia, reducible  EXT: Warm, pulses intact,   Neuro: Alert and oriented x3  Back: no CVA tenderness    Assessment/Plan: Pt with a left inguinal hernia and umbilical hernia. I discussed this at length with He.  I went over conservative management as well as surgical treatment of this.. I would plan on doing this via anrobotic  approach with possible use of mesh. I went over the small risks of surgery including but not limited to bleeding and infection, anesthesia, recurrence rates and nerve injury. I discussed the expected recovery time as well. Will get this scheduled. He will contact us to have this scheduled.      Dileep Palomo MD ,MD Dileep Palomo MD  General Surgery [phone]  Vascular Surgery [phone]          Again, thank you for allowing me to participate in the care of your patient.        Sincerely,        Dileep Palomo MD

## 2022-10-03 NOTE — PROGRESS NOTES
HPI:  This is a 62 year old male here today with concerns of pain and bulging in his left groin and umbilicus area. He has noted this for the past a a few  years. The symptoms have progressed and increased over this time. He comes in for evaluation secondary to the hernia causing enough issues to bother them with daily activities or chores.    Allergies:Meperidine and Bee venom    Past Medical History:   Diagnosis Date     Bipolar affective (H)      Chronic gout      Depression      Hyperlipidemia      Rosacea      Syncope     march 2012 work up done, no reason discovered       Past Surgical History:   Procedure Laterality Date     ARTHROPLASTY HIP  10/31/2012    Procedure: ARTHROPLASTY HIP;  RIGHT TOTAL HIP ARTHROPLASTY (SMITH & NEPHEW OXINIUM);  Surgeon: Adama Flores MD;  Location: [facility] OR     ARTHROPLASTY REVISION HIP  10/31/2012    Procedure: ARTHROPLASTY REVISION HIP;  REVISION RIGHT HIP - ACETABULAR COMPONENT and femoral head- MARY & NEPHEW;  Surgeon: Adama Flores MD;  Location: [facility] OR     CATARACT EXTRACTION Bilateral      DERMABRASION FACE       ENT SURGERY      tonsillectomy     EYE SURGERY      florin cataracts     JOINT REPLACEMENT Bilateral     hip     ORTHOPEDIC SURGERY      (L) hip resurfacing     TONSILLECTOMY         CURRENT MEDS:      Family History   Problem Relation Age of Onset     Dementia Mother      Cerebrovascular Disease Mother      Cerebrovascular Disease Father         reports that he quit smoking about 27 years ago. He has a 9.00 pack-year smoking history. He has never used smokeless tobacco. He reports current alcohol use of about 1.7 standard drinks of alcohol per week. He reports that he does not use drugs.    Review of Systems - Negative except pain and umbiliucs when lifting and working out    There were no vitals filed for this visit.    There is no height or weight on file to calculate BMI.    EXAM:  General: NAD   HEENT: normocephalic, PERRLA and  EOMS intact  Mounth: Mucus membranes moist  Neck: Supple  Chest: Clear to auscultation bilaterally  CV: RRR  ABD: Soft nontender and nondistended, left inguinal hernia and umbilical hernia, reducible  EXT: Warm, pulses intact,   Neuro: Alert and oriented x3  Back: no CVA tenderness    Assessment/Plan: Pt with a left inguinal hernia and umbilical hernia. I discussed this at length with He.  I went over conservative management as well as surgical treatment of this.. I would plan on doing this via anrobotic  approach with possible use of mesh. I went over the small risks of surgery including but not limited to bleeding and infection, anesthesia, recurrence rates and nerve injury. I discussed the expected recovery time as well. Will get this scheduled. He will contact us to have this scheduled.      Dileep Palomo MD ,MD Dileep Palomo MD  General Surgery [phone]  Vascular Surgery [phone]

## 2022-10-10 ENCOUNTER — TELEPHONE (OUTPATIENT)
Dept: SURGERY | Facility: CLINIC | Age: 62
End: 2022-10-10

## 2022-10-10 NOTE — LETTER
We've received instruction to get you scheduled for a  Hernia repair with Dr Palomo. We have that arranged as follows:     Pre-op Physical: 11/29/2022 at 12:20 p.m., with Dr. Noble at [facility]. Arrive at 12:10 p.m.    Surgery Date: 12/02/2022     Location: [place] [address]. Check in on 3rd floor; Left off the elevator    Approximate Arrival Time: 7:00 a.m. (Unless instructed differently by the pre-op call nurse)     Prep Tasks and Info:    1. Review your medications with your primary care or prescribing physician; they will advise you which meds to stop and when, and when you can resume taking.  Certain medications like blood thinners, need to be stopped in advance of surgery to proceed safely.    2. You must get tested for COVID-19, even if you are vaccinated. Complete at your pre-op physical with Dr. Noble on 11/29/2022.    3. Please shower the evening before and morning of surgery with Hibiclens or Exidine soap.  This can be found at your local pharmacy.    4. Fasting instructions will be provided by the pre-op nurse who will call you 1-3 days before surgery.  Typically we advise normal food up to 8 hours before surgery then clear liquids only up to 2 hours before surgery then nothing at all by mouth for 2 hours including no gum or candy.  The nurse will review your specific instructions with you at the call.      5. Smoking impacts your body's ability to heal properly.  If you are a smoker, we strongly urge you to stop smoking 4-6 weeks before surgery.    6. You will need an adult to drive you home and stay with you 24 hours after surgery. Public transportation or Medical Van Services are not permitted.    7. You may have one family member wait in the lobby at the surgery center during your surgery. Visitor restrictions are subject to change, please verify with the pre-op nurse when they call.    8. If the community sees a  new COVID19 surge, your procedure may need to be postponed. We will contact you if this happens. You will be screened for high-risk exposure to Covid-19 during the pre-op call.  We encourage you to quarantine yourself away from any Covid-19 people for 10 days before surgery to avoid possible last minute cancellations.   When you arrive to the surgery center, you will again be screened for COVID19 symptoms. If you screen positive, your surgery will need to be postponed.    9. We always encourage you to notify your insurance any time you have medical tests or procedures scheduled including surgery. The number is usually right on the back of your insurance card. Please call [facility] Cost of Care at [phone] if you'd like a surgery quote.       Call our office if you have any questions! Thank you!

## 2022-10-19 DIAGNOSIS — K40.90 LEFT INGUINAL HERNIA: Primary | ICD-10-CM

## 2022-10-19 DIAGNOSIS — K42.9 UMBILICAL HERNIA WITHOUT OBSTRUCTION AND WITHOUT GANGRENE: ICD-10-CM

## 2022-10-19 RX ORDER — ACETAMINOPHEN 325 MG/1
975 TABLET ORAL ONCE
Status: CANCELLED | OUTPATIENT
Start: 2022-10-19 | End: 2022-10-19

## 2022-11-04 NOTE — TELEPHONE ENCOUNTER
Talked with Will and he's scheduled for surgery on 12.02.22. We went over pre op appointment information as well as Covid testing requirements. Because of the timing of the pre-op, he will get his Covid test done at the same visit. I let him know I will send a confirmation letter to his MyChart. Patient in agreement with the plan.

## 2022-11-07 PROBLEM — K40.90 LEFT INGUINAL HERNIA: Status: ACTIVE | Noted: 2022-11-07

## 2022-11-07 PROBLEM — K42.9 UMBILICAL HERNIA WITHOUT OBSTRUCTION AND WITHOUT GANGRENE: Status: ACTIVE | Noted: 2022-11-07

## 2022-11-22 ENCOUNTER — TELEPHONE (OUTPATIENT)
Dept: SURGERY | Facility: CLINIC | Age: 62
End: 2022-11-22

## 2022-11-22 NOTE — TELEPHONE ENCOUNTER
LM for patient to return call. Need to find out if he's purchased the B[employer] product through [facility] in order to be covered for his procedure on 12.02.22.

## 2022-11-29 ENCOUNTER — OFFICE VISIT (OUTPATIENT)
Dept: FAMILY MEDICINE | Facility: CLINIC | Age: 62
End: 2022-11-29
Payer: COMMERCIAL

## 2022-11-29 VITALS
SYSTOLIC BLOOD PRESSURE: 130 MMHG | HEART RATE: 80 BPM | OXYGEN SATURATION: 96 % | RESPIRATION RATE: 20 BRPM | HEIGHT: 69 IN | WEIGHT: 228 LBS | BODY MASS INDEX: 33.77 KG/M2 | DIASTOLIC BLOOD PRESSURE: 80 MMHG

## 2022-11-29 DIAGNOSIS — R03.0 ELEVATED BLOOD PRESSURE READING WITHOUT DIAGNOSIS OF HYPERTENSION: ICD-10-CM

## 2022-11-29 DIAGNOSIS — K40.90 LEFT INGUINAL HERNIA: ICD-10-CM

## 2022-11-29 DIAGNOSIS — Z01.818 PREOPERATIVE EXAMINATION: Primary | ICD-10-CM

## 2022-11-29 DIAGNOSIS — E66.09 CLASS 1 OBESITY DUE TO EXCESS CALORIES WITHOUT SERIOUS COMORBIDITY WITH BODY MASS INDEX (BMI) OF 34.0 TO 34.9 IN ADULT: ICD-10-CM

## 2022-11-29 DIAGNOSIS — E66.811 CLASS 1 OBESITY DUE TO EXCESS CALORIES WITHOUT SERIOUS COMORBIDITY WITH BODY MASS INDEX (BMI) OF 34.0 TO 34.9 IN ADULT: ICD-10-CM

## 2022-11-29 DIAGNOSIS — M1A.00X0 CHRONIC GOUTY ARTHROPATHY: ICD-10-CM

## 2022-11-29 DIAGNOSIS — K42.9 UMBILICAL HERNIA WITHOUT OBSTRUCTION AND WITHOUT GANGRENE: ICD-10-CM

## 2022-11-29 DIAGNOSIS — L71.8 OCULAR ROSACEA: ICD-10-CM

## 2022-11-29 DIAGNOSIS — R73.01 IMPAIRED FASTING GLUCOSE: ICD-10-CM

## 2022-11-29 DIAGNOSIS — Z23 HIGH PRIORITY FOR 2019-NCOV VACCINE: ICD-10-CM

## 2022-11-29 PROCEDURE — 91313 COVID-19 VACCINE BIVALENT BOOSTER 18+ (MODERNA): CPT | Performed by: FAMILY MEDICINE

## 2022-11-29 PROCEDURE — 99215 OFFICE O/P EST HI 40 MIN: CPT | Performed by: FAMILY MEDICINE

## 2022-11-29 PROCEDURE — 0134A COVID-19 VACCINE BIVALENT BOOSTER 18+ (MODERNA): CPT | Performed by: FAMILY MEDICINE

## 2022-11-29 PROCEDURE — U0003 INFECTIOUS AGENT DETECTION BY NUCLEIC ACID (DNA OR RNA); SEVERE ACUTE RESPIRATORY SYNDROME CORONAVIRUS 2 (SARS-COV-2) (CORONAVIRUS DISEASE [COVID-19]), AMPLIFIED PROBE TECHNIQUE, MAKING USE OF HIGH THROUGHPUT TECHNOLOGIES AS DESCRIBED BY CMS-2020-01-R: HCPCS | Performed by: FAMILY MEDICINE

## 2022-11-29 PROCEDURE — U0005 INFEC AGEN DETEC AMPLI PROBE: HCPCS | Performed by: FAMILY MEDICINE

## 2022-11-29 NOTE — H&P (VIEW-ONLY)
[facility]  [address] [place] [address] [address] [facility] [address]  [facility] [address]  Phone: [phone]  Fax: [phone]  Primary Provider: Bubba Chinchilla  Pre-op Performing Provider: BUBBA CHINCHILLA      PREOPERATIVE EVALUATION:  Today's date: 11/29/2022    Low Waller is a 62 year old male who presents for a preoperative evaluation.    Surgical Information:  Surgery/Procedure: hernia  Surgery Location: [facility]  Surgeon: Dr. Palomo  Surgery Date: 12/2/22  Time of Surgery: TBD  Where patient plans to recover: At home with family  Fax number for surgical facility: Note does not need to be faxed, will be available electronically in Epic.    Type of Anesthesia Anticipated: to be determined    Preoperative examination  Preoperative examination completed.  Noted a left inguinal hernia as well as large umbilical hernia on exam.  Patient was scheduled hernia repair as noted on December 2, 2022 with Dr. Palomo.  COVID-19 PCR testing to be completed today.  - Asymptomatic COVID-19 Virus (Coronavirus) by PCR    Left inguinal hernia  Left inguinal hernia with question right inguinal hernia on exam.  Intraoperative exam for right inguinal canal likely.    Umbilical hernia without obstruction and without gangrene  Umbilical hernia, large.  Scheduled hernia repair December 2, 2022 noted.    Elevated blood pressure reading without diagnosis of hypertension  History of elevated blood pressure without hypertension.  Blood pressure on recheck 146/82 followed by 132/82.  Will monitor outside of office for goal less than 130/80 ideally.    Chronic gouty arthropathy  Chronic gouty arthropathy described.  Allopurinol 600 mg daily.  Normal renal function.  Prior CBC normal.  Prior uric acid level did increase slightly to 7.0.  Asymptomatic at this time and will repeat labs at wellness visit in 6 months    Ocular rosacea  Right eye ocular rosacea with continue treatment as noted.    Impaired fasting  "glucose  History of impaired fasting glucose.  Has lost few pounds since prior office visit August 31, 2022 and continuing resistance training activities.    Class 1 obesity due to excess calories without serious comorbidity with body mass index (BMI) of 34.0 to 34.9 in adult  Dietary and exercise modification for weight goal less than 220 pounds initially, less than 210 pounds ideally.    High priority for 2019-nCoV vaccine  Covid-19 bivalent Moderna booster to be provided.  - COVID-19,PF,MODERNA BIVALENT 18+Yrs     40 minutes total time spent on the date of encounter including patient chart review, counseling and coordination of cares, and documentation.       Subjective     HPI related to upcoming procedure: Preoperative examination completed today.  Left inguinal hernia along with umbilical hernia on exam.  Question of right inguinal hernia clinically on prior physical exam also noted otherwise remains asymptomatic in this regard.  Has had mild blood pressure elevation historically without hypertension.  Has had gout in the past however no recent concerns and continues use of allopurinol 300 mg tablets using 2 tablets (600 mg) by mouth daily.  Utilizes cyclosporine, doxycycline and erythromycin for ocular rosacea management as well.  Needs Covid-19 bivalent Moderna booster.  Staying physically active and doing resistance training activities as well currently.  Comprehensive review of systems as above otherwise all negative.      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 Flores); 10/31/12 right BRUNA (Dr. Flores)   Volunteer: \"Read to Achieve\" (read with a 10 y.o. for 1 hour every Sunday, but she is painfully shy...)   Raised Amish but left Muslim (believe in God)   PHQ-9 = 2/27 (10/17/12)        Preop Questions 11/29/2022   1. Have you ever had a heart attack or stroke? No   2. Have you ever had surgery on your heart or blood vessels, such as a stent placement, a coronary artery bypass, or surgery on an artery in your head, neck, heart, or legs? No   3. Do you have chest pain with activity? No   4. Do you have a history of  heart failure? No   5. Do you currently have a cold, bronchitis or symptoms of other infection? No   6. Do you have a cough, shortness of breath, or wheezing? No   7. Do you or anyone in your family have previous history of blood clots? No   8. Do you or does anyone in your family have a serious bleeding problem such as prolonged bleeding following surgeries or cuts? No   9. Have you ever had problems with anemia or been told to take iron pills? No   10. Have you had any abnormal blood loss such as black, tarry or bloody stools? No   11. Have you ever had a blood transfusion? No   12. Are you willing to have a blood transfusion if it is medically needed before, during, or after your surgery? Yes   13. Have you or any of your relatives ever had problems with anesthesia? No   14. Do you have sleep apnea, excessive snoring or daytime drowsiness? No   15. Do you have any artifical heart valves or other implanted medical devices like a pacemaker, defibrillator, or continuous glucose monitor? No   16. Do you have artificial joints? YES - right BRUNA   17. Are you allergic to latex? No     Health Care Directive:  Patient does not have a Health Care Directive or Living Will: Discussed advance care planning with patient; information given to patient to " review.    Preoperative Review of :   reviewed - no record of controlled substances prescribed.      Status of Chronic Conditions:  See problem list for active medical problems.  Problems all longstanding and stable, except as noted/documented.  See ROS for pertinent symptoms related to these conditions.      Review of Systems  CONSTITUTIONAL: NEGATIVE for fever, chills, change in weight  INTEGUMENTARY/SKIN: NEGATIVE for worrisome rashes, moles or lesions  EYES: NEGATIVE for vision changes or irritation  ENT/MOUTH: NEGATIVE for ear, mouth and throat problems  RESP: NEGATIVE for significant cough or SOB  CV: NEGATIVE for chest pain, palpitations or peripheral edema  GI: NEGATIVE for nausea, abdominal pain, heartburn, or change in bowel habits  : NEGATIVE for frequency, dysuria, or hematuria  MUSCULOSKELETAL: NEGATIVE for significant arthralgias or myalgia  NEURO: NEGATIVE for weakness, dizziness or paresthesias  ENDOCRINE: NEGATIVE for temperature intolerance, skin/hair changes  HEME: NEGATIVE for bleeding problems  PSYCHIATRIC: NEGATIVE for changes in mood or affect    Patient Active Problem List    Diagnosis Date Noted     Left inguinal hernia 11/07/2022     Priority: Medium     Added automatically from request for surgery [id number]       Umbilical hernia without obstruction and without gangrene 11/07/2022     Priority: Medium     Added automatically from request for surgery [id number]       Elevated blood pressure reading without diagnosis of hypertension 11/11/2021     Priority: Medium     Class 2 obesity due to excess calories without serious comorbidity with body mass index (BMI) of 36.0 to 36.9 in adult 11/11/2021     Priority: Medium     Ocular rosacea 11/11/2021     Priority: Medium     Idiopathic chronic gout of foot without tophus, unspecified laterality 10/31/2012     Priority: Medium     Status post revision of total hip 10/31/2012     Priority: Medium     Primary osteoarthritis of right hip  10/24/2012     Priority: Medium      Past Medical History:   Diagnosis Date     Bipolar affective (H)      Chronic gout      Depression      Hyperlipidemia      Rosacea      Syncope     2012 work up done, no reason discovered     Past Surgical History:   Procedure Laterality Date     ARTHROPLASTY HIP  10/31/2012    Procedure: ARTHROPLASTY HIP;  RIGHT TOTAL HIP ARTHROPLASTY (SMITH & NEPHEW OXINIUM);  Surgeon: Adama Flores MD;  Location: [facility] OR     ARTHROPLASTY REVISION HIP  10/31/2012    Procedure: ARTHROPLASTY REVISION HIP;  REVISION RIGHT HIP - ACETABULAR COMPONENT and femoral head- MARY & NEPHEW;  Surgeon: Adama Flores MD;  Location: [facility] OR     CATARACT EXTRACTION Bilateral      DERMABRASION FACE       ENT SURGERY      tonsillectomy     EYE SURGERY      florin cataracts     JOINT REPLACEMENT Bilateral     hip     ORTHOPEDIC SURGERY      (L) hip resurfacing     TONSILLECTOMY       Current Outpatient Medications   Medication Sig Dispense Refill     allopurinol (ZYLOPRIM) 300 MG tablet TAKE 2 TABLETS (600 MG) BY MOUTH DAILY       cycloSPORINE (RESTASIS) 0.05 % ophthalmic emulsion Place 1 drop into both eyes       doxycycline hyclate (VIBRA-TABS) 100 MG tablet Take 1 tablet by mouth 2 times daily       erythromycin (ROMYCIN) 5 MG/GM ophthalmic ointment APPLY 0.25 INCH RIBBON TO BOTH EYES EVERY EVENING         Allergies   Allergen Reactions     Meperidine Rash     Other reaction(s): Hives     Bee Venom         Social History     Tobacco Use     Smoking status: Former     Packs/day: 0.50     Years: 18.00     Pack years: 9.00     Types: Cigarettes     Quit date: 1995     Years since quittin.8     Smokeless tobacco: Never     Tobacco comments:     quit in    Substance Use Topics     Alcohol use: Yes     Alcohol/week: 1.7 standard drinks     Comment: Alcoholic Drinks/day: occasionally      Family History   Problem Relation Age of Onset     Dementia Mother      Cerebrovascular  "Disease Mother      Cerebrovascular Disease Father      History   Drug Use No         Objective     /80   Pulse 80   Resp 20   Ht 1.74 m (5' 8.5\")   Wt 103.4 kg (228 lb)   SpO2 96%   BMI 34.16 kg/m      Physical Exam    GENERAL APPEARANCE: healthy, alert and no distress     EYES: EOMI,  PERRL     HENT: ear canals and TM's normal and nose and mouth without ulcers or lesions     NECK: no adenopathy, no asymmetry, masses, or scars and thyroid normal to palpation     RESP: lungs clear to auscultation - no rales, rhonchi or wheezes     CV: regular rates and rhythm, normal S1 S2, no S3 or S4 and no murmur, click or rub     ABDOMEN:  soft, nontender, no HSM or masses and bowel sounds normal     MS: extremities normal- no gross deformities noted, no evidence of inflammation in joints, FROM in all extremities.     SKIN: no suspicious lesions or rashes     NEURO: Normal strength and tone, sensory exam grossly normal, mentation intact and speech normal     PSYCH: mentation appears normal. and affect normal/bright     LYMPHATICS: No cervical adenopathy    Recent Labs   Lab Test 08/31/22  0853 03/11/22  0711 03/11/22  0710   HGB 15.9 16.2  --     241  --    NA  --   --  140   POTASSIUM  --   --  4.1   CR 0.73  --  0.81   A1C 5.2 5.6  --         Diagnostics:  Labs pending at this time.  Results will be reviewed when available.  No results found for this or any previous visit (from the past 24 hour(s)).   No EKG required, no history of coronary heart disease, significant arrhythmia, peripheral arterial disease or other structural heart disease.    Revised Cardiac Risk Index (RCRI):  The patient has the following serious cardiovascular risks for perioperative complications:   - No serious cardiac risks = 0 points     RCRI Interpretation: 0 points: Class I (very low risk - 0.4% complication rate)           Signed Electronically by: Josh Noble MD  Copy of this evaluation report is provided to requesting " physician.

## 2022-11-29 NOTE — PROGRESS NOTES
[facility]  [address] [place] [address] [address] [facility] [address]  [facility] [address]  Phone: [phone]  Fax: [phone]  Primary Provider: Bubba Chinchilla  Pre-op Performing Provider: BUBBA CHINCHILLA      PREOPERATIVE EVALUATION:  Today's date: 11/29/2022    Low Waller is a 62 year old male who presents for a preoperative evaluation.    Surgical Information:  Surgery/Procedure: hernia  Surgery Location: [facility]  Surgeon: Dr. Palomo  Surgery Date: 12/2/22  Time of Surgery: TBD  Where patient plans to recover: At home with family  Fax number for surgical facility: Note does not need to be faxed, will be available electronically in Epic.    Type of Anesthesia Anticipated: to be determined    Preoperative examination  Preoperative examination completed.  Noted a left inguinal hernia as well as large umbilical hernia on exam.  Patient was scheduled hernia repair as noted on December 2, 2022 with Dr. Palomo.  COVID-19 PCR testing to be completed today.  - Asymptomatic COVID-19 Virus (Coronavirus) by PCR    Left inguinal hernia  Left inguinal hernia with question right inguinal hernia on exam.  Intraoperative exam for right inguinal canal likely.    Umbilical hernia without obstruction and without gangrene  Umbilical hernia, large.  Scheduled hernia repair December 2, 2022 noted.    Elevated blood pressure reading without diagnosis of hypertension  History of elevated blood pressure without hypertension.  Blood pressure on recheck 146/82 followed by 132/82.  Will monitor outside of office for goal less than 130/80 ideally.    Chronic gouty arthropathy  Chronic gouty arthropathy described.  Allopurinol 600 mg daily.  Normal renal function.  Prior CBC normal.  Prior uric acid level did increase slightly to 7.0.  Asymptomatic at this time and will repeat labs at wellness visit in 6 months    Ocular rosacea  Right eye ocular rosacea with continue treatment as noted.    Impaired fasting  "glucose  History of impaired fasting glucose.  Has lost few pounds since prior office visit August 31, 2022 and continuing resistance training activities.    Class 1 obesity due to excess calories without serious comorbidity with body mass index (BMI) of 34.0 to 34.9 in adult  Dietary and exercise modification for weight goal less than 220 pounds initially, less than 210 pounds ideally.    High priority for 2019-nCoV vaccine  Covid-19 bivalent Moderna booster to be provided.  - COVID-19,PF,MODERNA BIVALENT 18+Yrs     40 minutes total time spent on the date of encounter including patient chart review, counseling and coordination of cares, and documentation.       Subjective     HPI related to upcoming procedure: Preoperative examination completed today.  Left inguinal hernia along with umbilical hernia on exam.  Question of right inguinal hernia clinically on prior physical exam also noted otherwise remains asymptomatic in this regard.  Has had mild blood pressure elevation historically without hypertension.  Has had gout in the past however no recent concerns and continues use of allopurinol 300 mg tablets using 2 tablets (600 mg) by mouth daily.  Utilizes cyclosporine, doxycycline and erythromycin for ocular rosacea management as well.  Needs Covid-19 bivalent Moderna booster.  Staying physically active and doing resistance training activities as well currently.  Comprehensive review of systems as above otherwise all negative.      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 Flores); 10/31/12 right BRUNA (Dr. Flores)   Volunteer: \"Read to Achieve\" (read with a 10 y.o. for 1 hour every Sunday, but she is painfully shy...)   Raised [employer] but left Faith (believe in God)   PHQ-9 = 2/27 (10/17/12)        Preop Questions 11/29/2022   1. Have you ever had a heart attack or stroke? No   2. Have you ever had surgery on your heart or blood vessels, such as a stent placement, a coronary artery bypass, or surgery on an artery in your head, neck, heart, or legs? No   3. Do you have chest pain with activity? No   4. Do you have a history of  heart failure? No   5. Do you currently have a cold, bronchitis or symptoms of other infection? No   6. Do you have a cough, shortness of breath, or wheezing? No   7. Do you or anyone in your family have previous history of blood clots? No   8. Do you or does anyone in your family have a serious bleeding problem such as prolonged bleeding following surgeries or cuts? No   9. Have you ever had problems with anemia or been told to take iron pills? No   10. Have you had any abnormal blood loss such as black, tarry or bloody stools? No   11. Have you ever had a blood transfusion? No   12. Are you willing to have a blood transfusion if it is medically needed before, during, or after your surgery? Yes   13. Have you or any of your relatives ever had problems with anesthesia? No   14. Do you have sleep apnea, excessive snoring or daytime drowsiness? No   15. Do you have any artifical heart valves or other implanted medical devices like a pacemaker, defibrillator, or continuous glucose monitor? No   16. Do you have artificial joints? YES - right BRUNA   17. Are you allergic to latex? No     Health Care Directive:  Patient does not have a Health Care Directive or Living Will: Discussed advance care planning with patient; information given to patient to " review.    Preoperative Review of :   reviewed - no record of controlled substances prescribed.      Status of Chronic Conditions:  See problem list for active medical problems.  Problems all longstanding and stable, except as noted/documented.  See ROS for pertinent symptoms related to these conditions.      Review of Systems  CONSTITUTIONAL: NEGATIVE for fever, chills, change in weight  INTEGUMENTARY/SKIN: NEGATIVE for worrisome rashes, moles or lesions  EYES: NEGATIVE for vision changes or irritation  ENT/MOUTH: NEGATIVE for ear, mouth and throat problems  RESP: NEGATIVE for significant cough or SOB  CV: NEGATIVE for chest pain, palpitations or peripheral edema  GI: NEGATIVE for nausea, abdominal pain, heartburn, or change in bowel habits  : NEGATIVE for frequency, dysuria, or hematuria  MUSCULOSKELETAL: NEGATIVE for significant arthralgias or myalgia  NEURO: NEGATIVE for weakness, dizziness or paresthesias  ENDOCRINE: NEGATIVE for temperature intolerance, skin/hair changes  HEME: NEGATIVE for bleeding problems  PSYCHIATRIC: NEGATIVE for changes in mood or affect    Patient Active Problem List    Diagnosis Date Noted     Left inguinal hernia 11/07/2022     Priority: Medium     Added automatically from request for surgery [id number]       Umbilical hernia without obstruction and without gangrene 11/07/2022     Priority: Medium     Added automatically from request for surgery [id number]       Elevated blood pressure reading without diagnosis of hypertension 11/11/2021     Priority: Medium     Class 2 obesity due to excess calories without serious comorbidity with body mass index (BMI) of 36.0 to 36.9 in adult 11/11/2021     Priority: Medium     Ocular rosacea 11/11/2021     Priority: Medium     Idiopathic chronic gout of foot without tophus, unspecified laterality 10/31/2012     Priority: Medium     Status post revision of total hip 10/31/2012     Priority: Medium     Primary osteoarthritis of right hip  10/24/2012     Priority: Medium      Past Medical History:   Diagnosis Date     Bipolar affective (H)      Chronic gout      Depression      Hyperlipidemia      Rosacea      Syncope     2012 work up done, no reason discovered     Past Surgical History:   Procedure Laterality Date     ARTHROPLASTY HIP  10/31/2012    Procedure: ARTHROPLASTY HIP;  RIGHT TOTAL HIP ARTHROPLASTY (SMITH & NEPHEW OXINIUM);  Surgeon: Adama Flores MD;  Location: [facility] OR     ARTHROPLASTY REVISION HIP  10/31/2012    Procedure: ARTHROPLASTY REVISION HIP;  REVISION RIGHT HIP - ACETABULAR COMPONENT and femoral head- MARY & NEPHEW;  Surgeon: Adama Flores MD;  Location: [facility] OR     CATARACT EXTRACTION Bilateral      DERMABRASION FACE       ENT SURGERY      tonsillectomy     EYE SURGERY      florin cataracts     JOINT REPLACEMENT Bilateral     hip     ORTHOPEDIC SURGERY      (L) hip resurfacing     TONSILLECTOMY       Current Outpatient Medications   Medication Sig Dispense Refill     allopurinol (ZYLOPRIM) 300 MG tablet TAKE 2 TABLETS (600 MG) BY MOUTH DAILY       cycloSPORINE (RESTASIS) 0.05 % ophthalmic emulsion Place 1 drop into both eyes       doxycycline hyclate (VIBRA-TABS) 100 MG tablet Take 1 tablet by mouth 2 times daily       erythromycin (ROMYCIN) 5 MG/GM ophthalmic ointment APPLY 0.25 INCH RIBBON TO BOTH EYES EVERY EVENING         Allergies   Allergen Reactions     Meperidine Rash     Other reaction(s): Hives     Bee Venom         Social History     Tobacco Use     Smoking status: Former     Packs/day: 0.50     Years: 18.00     Pack years: 9.00     Types: Cigarettes     Quit date: 1995     Years since quittin.8     Smokeless tobacco: Never     Tobacco comments:     quit in    Substance Use Topics     Alcohol use: Yes     Alcohol/week: 1.7 standard drinks     Comment: Alcoholic Drinks/day: occasionally      Family History   Problem Relation Age of Onset     Dementia Mother      Cerebrovascular  "Disease Mother      Cerebrovascular Disease Father      History   Drug Use No         Objective     /80   Pulse 80   Resp 20   Ht 1.74 m (5' 8.5\")   Wt 103.4 kg (228 lb)   SpO2 96%   BMI 34.16 kg/m      Physical Exam    GENERAL APPEARANCE: healthy, alert and no distress     EYES: EOMI,  PERRL     HENT: ear canals and TM's normal and nose and mouth without ulcers or lesions     NECK: no adenopathy, no asymmetry, masses, or scars and thyroid normal to palpation     RESP: lungs clear to auscultation - no rales, rhonchi or wheezes     CV: regular rates and rhythm, normal S1 S2, no S3 or S4 and no murmur, click or rub     ABDOMEN:  soft, nontender, no HSM or masses and bowel sounds normal     MS: extremities normal- no gross deformities noted, no evidence of inflammation in joints, FROM in all extremities.     SKIN: no suspicious lesions or rashes     NEURO: Normal strength and tone, sensory exam grossly normal, mentation intact and speech normal     PSYCH: mentation appears normal. and affect normal/bright     LYMPHATICS: No cervical adenopathy    Recent Labs   Lab Test 08/31/22  0853 03/11/22  0711 03/11/22  0710   HGB 15.9 16.2  --     241  --    NA  --   --  140   POTASSIUM  --   --  4.1   CR 0.73  --  0.81   A1C 5.2 5.6  --         Diagnostics:  Labs pending at this time.  Results will be reviewed when available.  No results found for this or any previous visit (from the past 24 hour(s)).   No EKG required, no history of coronary heart disease, significant arrhythmia, peripheral arterial disease or other structural heart disease.    Revised Cardiac Risk Index (RCRI):  The patient has the following serious cardiovascular risks for perioperative complications:   - No serious cardiac risks = 0 points     RCRI Interpretation: 0 points: Class I (very low risk - 0.4% complication rate)           Signed Electronically by: Josh Noble MD  Copy of this evaluation report is provided to requesting " physician.

## 2022-11-30 LAB — SARS-COV-2 RNA RESP QL NAA+PROBE: NEGATIVE

## 2022-12-01 ENCOUNTER — ANESTHESIA EVENT (OUTPATIENT)
Dept: SURGERY | Facility: AMBULATORY SURGERY CENTER | Age: 62
End: 2022-12-01
Payer: COMMERCIAL

## 2022-12-02 ENCOUNTER — ANESTHESIA (OUTPATIENT)
Dept: SURGERY | Facility: AMBULATORY SURGERY CENTER | Age: 62
End: 2022-12-02
Payer: COMMERCIAL

## 2022-12-02 ENCOUNTER — HOSPITAL ENCOUNTER (OUTPATIENT)
Facility: AMBULATORY SURGERY CENTER | Age: 62
Discharge: HOME | End: 2022-12-02
Attending: SPECIALIST
Payer: COMMERCIAL

## 2022-12-02 VITALS
DIASTOLIC BLOOD PRESSURE: 77 MMHG | TEMPERATURE: 96.8 F | HEART RATE: 62 BPM | WEIGHT: 228 LBS | SYSTOLIC BLOOD PRESSURE: 134 MMHG | OXYGEN SATURATION: 93 % | BODY MASS INDEX: 33.77 KG/M2 | RESPIRATION RATE: 16 BRPM | HEIGHT: 69 IN

## 2022-12-02 DIAGNOSIS — K40.90 LEFT INGUINAL HERNIA: ICD-10-CM

## 2022-12-02 DIAGNOSIS — K42.9 UMBILICAL HERNIA WITHOUT OBSTRUCTION AND WITHOUT GANGRENE: ICD-10-CM

## 2022-12-02 DIAGNOSIS — K43.9 VENTRAL HERNIA WITHOUT OBSTRUCTION OR GANGRENE: ICD-10-CM

## 2022-12-02 DIAGNOSIS — K40.90 RIGHT INGUINAL HERNIA: ICD-10-CM

## 2022-12-02 DIAGNOSIS — K40.20 NON-RECURRENT BILATERAL INGUINAL HERNIA WITHOUT OBSTRUCTION OR GANGRENE: Primary | ICD-10-CM

## 2022-12-02 PROCEDURE — 49650 LAP ING HERNIA REPAIR INIT: CPT | Mod: 50 | Performed by: SPECIALIST

## 2022-12-02 PROCEDURE — 49568 PR REPAIR HERNIA WITH MESH: CPT | Mod: 59 | Performed by: SPECIALIST

## 2022-12-02 PROCEDURE — 49585 PR REPAIR UMBILICAL HERN,5+Y/O,REDUC: CPT | Mod: 51 | Performed by: SPECIALIST

## 2022-12-02 PROCEDURE — 49560 PR REPAIR INCISIONAL HERNIA,REDUCIBLE: CPT | Performed by: SPECIALIST

## 2022-12-02 DEVICE — PATCH, HERNIA, SMALL, CIRCLE WITH STRAP, VENTRALEX ST, 1.7IN (4.3CM) DIAMETER 5950007: Type: IMPLANTABLE DEVICE | Site: ABDOMEN | Status: FUNCTIONAL

## 2022-12-02 DEVICE — LAP PROGRIP 15X10CM LPG1510X2: Type: IMPLANTABLE DEVICE | Site: GROIN | Status: FUNCTIONAL

## 2022-12-02 RX ORDER — MAGNESIUM SULFATE HEPTAHYDRATE 40 MG/ML
4 INJECTION, SOLUTION INTRAVENOUS ONCE
Status: COMPLETED | OUTPATIENT
Start: 2022-12-02 | End: 2022-12-02

## 2022-12-02 RX ORDER — HYDROCODONE BITARTRATE AND ACETAMINOPHEN 5; 325 MG/1; MG/1
1-2 TABLET ORAL EVERY 6 HOURS PRN
Qty: 18 TABLET | Refills: 0 | Status: SHIPPED | OUTPATIENT
Start: 2022-12-02 | End: 2022-12-05

## 2022-12-02 RX ORDER — FENTANYL CITRATE 0.05 MG/ML
25 INJECTION, SOLUTION INTRAMUSCULAR; INTRAVENOUS
Status: DISCONTINUED | OUTPATIENT
Start: 2022-12-02 | End: 2022-12-03 | Stop reason: HOSPADM

## 2022-12-02 RX ORDER — ACETAMINOPHEN 325 MG/1
975 TABLET ORAL ONCE
Status: COMPLETED | OUTPATIENT
Start: 2022-12-02 | End: 2022-12-02

## 2022-12-02 RX ORDER — KETOROLAC TROMETHAMINE 30 MG/ML
INJECTION, SOLUTION INTRAMUSCULAR; INTRAVENOUS PRN
Status: DISCONTINUED | OUTPATIENT
Start: 2022-12-02 | End: 2022-12-02

## 2022-12-02 RX ORDER — CEFAZOLIN SODIUM 2 G/100ML
2 INJECTION, SOLUTION INTRAVENOUS SEE ADMIN INSTRUCTIONS
Status: DISCONTINUED | OUTPATIENT
Start: 2022-12-02 | End: 2022-12-03 | Stop reason: HOSPADM

## 2022-12-02 RX ORDER — HYDROMORPHONE HCL IN WATER/PF 6 MG/30 ML
0.2 PATIENT CONTROLLED ANALGESIA SYRINGE INTRAVENOUS EVERY 5 MIN PRN
Status: DISCONTINUED | OUTPATIENT
Start: 2022-12-02 | End: 2022-12-03 | Stop reason: HOSPADM

## 2022-12-02 RX ORDER — PROPOFOL 10 MG/ML
INJECTION, EMULSION INTRAVENOUS CONTINUOUS PRN
Status: DISCONTINUED | OUTPATIENT
Start: 2022-12-02 | End: 2022-12-02

## 2022-12-02 RX ORDER — SODIUM CHLORIDE, SODIUM LACTATE, POTASSIUM CHLORIDE, CALCIUM CHLORIDE 600; 310; 30; 20 MG/100ML; MG/100ML; MG/100ML; MG/100ML
INJECTION, SOLUTION INTRAVENOUS CONTINUOUS
Status: DISCONTINUED | OUTPATIENT
Start: 2022-12-02 | End: 2022-12-03 | Stop reason: HOSPADM

## 2022-12-02 RX ORDER — FENTANYL CITRATE 0.05 MG/ML
50 INJECTION, SOLUTION INTRAMUSCULAR; INTRAVENOUS EVERY 5 MIN PRN
Status: DISCONTINUED | OUTPATIENT
Start: 2022-12-02 | End: 2022-12-03 | Stop reason: HOSPADM

## 2022-12-02 RX ORDER — HYDROMORPHONE HCL IN WATER/PF 6 MG/30 ML
0.4 PATIENT CONTROLLED ANALGESIA SYRINGE INTRAVENOUS EVERY 5 MIN PRN
Status: DISCONTINUED | OUTPATIENT
Start: 2022-12-02 | End: 2022-12-03 | Stop reason: HOSPADM

## 2022-12-02 RX ORDER — OXYCODONE HYDROCHLORIDE 5 MG/1
5 TABLET ORAL ONCE
Status: COMPLETED | OUTPATIENT
Start: 2022-12-02 | End: 2022-12-02

## 2022-12-02 RX ORDER — BUPIVACAINE HYDROCHLORIDE 2.5 MG/ML
INJECTION, SOLUTION INFILTRATION; PERINEURAL PRN
Status: DISCONTINUED | OUTPATIENT
Start: 2022-12-02 | End: 2022-12-02 | Stop reason: HOSPADM

## 2022-12-02 RX ORDER — ONDANSETRON 4 MG/1
4 TABLET, ORALLY DISINTEGRATING ORAL EVERY 30 MIN PRN
Status: DISCONTINUED | OUTPATIENT
Start: 2022-12-02 | End: 2022-12-03 | Stop reason: HOSPADM

## 2022-12-02 RX ORDER — SODIUM CHLORIDE, SODIUM LACTATE, POTASSIUM CHLORIDE, CALCIUM CHLORIDE 600; 310; 30; 20 MG/100ML; MG/100ML; MG/100ML; MG/100ML
INJECTION, SOLUTION INTRAVENOUS CONTINUOUS PRN
Status: DISCONTINUED | OUTPATIENT
Start: 2022-12-02 | End: 2022-12-02

## 2022-12-02 RX ORDER — ONDANSETRON 2 MG/ML
4 INJECTION INTRAMUSCULAR; INTRAVENOUS EVERY 30 MIN PRN
Status: DISCONTINUED | OUTPATIENT
Start: 2022-12-02 | End: 2022-12-03 | Stop reason: HOSPADM

## 2022-12-02 RX ORDER — DEXAMETHASONE SODIUM PHOSPHATE 4 MG/ML
INJECTION, SOLUTION INTRA-ARTICULAR; INTRALESIONAL; INTRAMUSCULAR; INTRAVENOUS; SOFT TISSUE PRN
Status: DISCONTINUED | OUTPATIENT
Start: 2022-12-02 | End: 2022-12-02

## 2022-12-02 RX ORDER — FENTANYL CITRATE 0.05 MG/ML
25 INJECTION, SOLUTION INTRAMUSCULAR; INTRAVENOUS EVERY 5 MIN PRN
Status: DISCONTINUED | OUTPATIENT
Start: 2022-12-02 | End: 2022-12-03 | Stop reason: HOSPADM

## 2022-12-02 RX ORDER — CEFAZOLIN SODIUM 2 G/100ML
2 INJECTION, SOLUTION INTRAVENOUS
Status: COMPLETED | OUTPATIENT
Start: 2022-12-02 | End: 2022-12-02

## 2022-12-02 RX ORDER — MEPERIDINE HYDROCHLORIDE 25 MG/ML
12.5 INJECTION INTRAMUSCULAR; INTRAVENOUS; SUBCUTANEOUS
Status: DISCONTINUED | OUTPATIENT
Start: 2022-12-02 | End: 2022-12-03 | Stop reason: HOSPADM

## 2022-12-02 RX ORDER — ACETAMINOPHEN 325 MG/1
975 TABLET ORAL ONCE
Status: DISCONTINUED | OUTPATIENT
Start: 2022-12-02 | End: 2022-12-03 | Stop reason: HOSPADM

## 2022-12-02 RX ORDER — FENTANYL CITRATE 0.05 MG/ML
100 INJECTION, SOLUTION INTRAMUSCULAR; INTRAVENOUS
Status: DISCONTINUED | OUTPATIENT
Start: 2022-12-02 | End: 2022-12-03 | Stop reason: HOSPADM

## 2022-12-02 RX ORDER — ONDANSETRON 2 MG/ML
INJECTION INTRAMUSCULAR; INTRAVENOUS PRN
Status: DISCONTINUED | OUTPATIENT
Start: 2022-12-02 | End: 2022-12-02

## 2022-12-02 RX ORDER — PROPOFOL 10 MG/ML
INJECTION, EMULSION INTRAVENOUS PRN
Status: DISCONTINUED | OUTPATIENT
Start: 2022-12-02 | End: 2022-12-02

## 2022-12-02 RX ORDER — LIDOCAINE 40 MG/G
CREAM TOPICAL
Status: DISCONTINUED | OUTPATIENT
Start: 2022-12-02 | End: 2022-12-03 | Stop reason: HOSPADM

## 2022-12-02 RX ADMIN — ACETAMINOPHEN 975 MG: 325 TABLET ORAL at 07:39

## 2022-12-02 RX ADMIN — DEXAMETHASONE SODIUM PHOSPHATE 8 MG: 4 INJECTION, SOLUTION INTRA-ARTICULAR; INTRALESIONAL; INTRAMUSCULAR; INTRAVENOUS; SOFT TISSUE at 08:26

## 2022-12-02 RX ADMIN — FENTANYL CITRATE 100 MCG: 0.05 INJECTION, SOLUTION INTRAMUSCULAR; INTRAVENOUS at 08:26

## 2022-12-02 RX ADMIN — SODIUM CHLORIDE, SODIUM LACTATE, POTASSIUM CHLORIDE, CALCIUM CHLORIDE: 600; 310; 30; 20 INJECTION, SOLUTION INTRAVENOUS at 08:05

## 2022-12-02 RX ADMIN — CEFAZOLIN SODIUM 2 G: 2 INJECTION, SOLUTION INTRAVENOUS at 08:36

## 2022-12-02 RX ADMIN — OXYCODONE HYDROCHLORIDE 5 MG: 5 TABLET ORAL at 10:30

## 2022-12-02 RX ADMIN — SODIUM CHLORIDE, SODIUM LACTATE, POTASSIUM CHLORIDE, CALCIUM CHLORIDE: 600; 310; 30; 20 INJECTION, SOLUTION INTRAVENOUS at 07:48

## 2022-12-02 RX ADMIN — PROPOFOL 200 MCG/KG/MIN: 10 INJECTION, EMULSION INTRAVENOUS at 08:36

## 2022-12-02 RX ADMIN — Medication 100 MCG: at 08:57

## 2022-12-02 RX ADMIN — PROPOFOL 200 MG: 10 INJECTION, EMULSION INTRAVENOUS at 08:26

## 2022-12-02 RX ADMIN — MAGNESIUM SULFATE HEPTAHYDRATE 4 G: 40 INJECTION, SOLUTION INTRAVENOUS at 07:52

## 2022-12-02 RX ADMIN — FENTANYL CITRATE 50 MCG: 0.05 INJECTION, SOLUTION INTRAMUSCULAR; INTRAVENOUS at 09:44

## 2022-12-02 RX ADMIN — KETOROLAC TROMETHAMINE 15 MG: 30 INJECTION, SOLUTION INTRAMUSCULAR; INTRAVENOUS at 09:14

## 2022-12-02 RX ADMIN — ONDANSETRON 4 MG: 2 INJECTION INTRAMUSCULAR; INTRAVENOUS at 08:49

## 2022-12-02 RX ADMIN — Medication 50 MG: at 08:26

## 2022-12-02 NOTE — ANESTHESIA PROCEDURE NOTES
Airway       Patient location during procedure: OR       Procedure Start/Stop Times: 12/2/2022 8:27 AM  Staff -        CRNA: Xochitl Sewell APRN CRNA       Performed By: CRNA  Consent for Airway        Urgency: elective  Indications and Patient Condition       Indications for airway management: chandu-procedural         Mask difficulty assessment: 1 - vent by mask    Final Airway Details       Final airway type: endotracheal airway       Successful airway: ETT - single  Endotracheal Airway Details        ETT size (mm): 7.5       Cuffed: yes       Cuff volume (mL): 5       Successful intubation technique: direct laryngoscopy       DL Blade Type: Leija 2       Grade View of Cords: 2       Adjucts: stylet       Position: Center       Measured from: gums/teeth       Secured at (cm): 23    Post intubation assessment        Placement verified by: capnometry, equal breath sounds and chest rise        Number of attempts at approach: 2       Secured with: silk tape       Ease of procedure: easy       Dentition: Intact       Dental guard used and removed. Dental Guard Type: Proguard Red.    Medication(s) Administered   Medication Administration Time: 12/2/2022 8:27 AM

## 2022-12-02 NOTE — INTERVAL H&P NOTE
"I have reviewed the surgical (or preoperative) H&P that is linked to this encounter, and examined the patient. There are no significant changes.        Dileep Palomo MD  General Surgery [phone]  Vascular Surgery [phone]          Clinical Conditions Present on Arrival:  Clinically Significant Risk Factors Present on Admission                    # Obesity: Estimated body mass index is 34.16 kg/m  as calculated from the following:    Height as of this encounter: 1.74 m (5' 8.5\").    Weight as of this encounter: 103.4 kg (228 lb).       "

## 2022-12-02 NOTE — ANESTHESIA PREPROCEDURE EVALUATION
Anesthesia Pre-Procedure Evaluation    Patient: Low Waller   MRN: [id number] : 1960        Procedure : Procedure(s):  HERNIORRHAPHY, INGUINAL, LAPAROSCOPIC, HERNIORRHAPHY, UMBILICAL, OPEN  HERNIORRHAPHY, UMBILICAL, OPEN          Past Medical History:   Diagnosis Date     Bipolar affective (H)      Chronic gout      Depression      Hyperlipidemia      Motion sickness      Obese      Rosacea      Syncope     2012 work up done, no reason discovered      Past Surgical History:   Procedure Laterality Date     ARTHROPLASTY HIP  10/31/2012    Procedure: ARTHROPLASTY HIP;  RIGHT TOTAL HIP ARTHROPLASTY (SMITH & NEPHEW OXINIUM);  Surgeon: Adama Flores MD;  Location: [facility] OR     ARTHROPLASTY REVISION HIP  10/31/2012    Procedure: ARTHROPLASTY REVISION HIP;  REVISION RIGHT HIP - ACETABULAR COMPONENT and femoral head- MARY & NEPHEW;  Surgeon: Adama Flores MD;  Location: [facility] OR     CATARACT EXTRACTION Bilateral      DERMABRASION FACE       ENT SURGERY      tonsillectomy     EYE SURGERY      florin cataracts     JOINT REPLACEMENT Bilateral     hip     ORTHOPEDIC SURGERY      (L) hip resurfacing     TONSILLECTOMY        Allergies   Allergen Reactions     Meperidine Rash     Other reaction(s): Hives     Bee Venom       Social History     Tobacco Use     Smoking status: Former     Packs/day: 0.50     Years: 18.00     Pack years: 9.00     Types: Cigarettes     Quit date: 1995     Years since quittin.8     Smokeless tobacco: Never     Tobacco comments:     quit in    Substance Use Topics     Alcohol use: Yes     Alcohol/week: 1.7 standard drinks     Comment: Alcoholic Drinks/day: occasionally       Wt Readings from Last 1 Encounters:   22 103.4 kg (228 lb)        Anesthesia Evaluation            ROS/MED HX  ENT/Pulmonary:  - neg pulmonary ROS     Neurologic:  - neg neurologic ROS     Cardiovascular:     (+) -----fainting (syncope).     METS/Exercise Tolerance:      Hematologic:  - neg hematologic  ROS     Musculoskeletal:  - neg musculoskeletal ROS     GI/Hepatic:  - neg GI/hepatic ROS     Renal/Genitourinary:  - neg Renal ROS     Endo:     (+) Obesity,     Psychiatric/Substance Use:  - neg psychiatric ROS     Infectious Disease:  - neg infectious disease ROS     Malignancy:  - neg malignancy ROS     Other: Comment: gout           Physical Exam    Airway  airway exam normal      Mallampati: II       Respiratory Devices and Support         Dental  no notable dental history         Cardiovascular   cardiovascular exam normal       Rhythm and rate: regular and normal     Pulmonary   pulmonary exam normal        breath sounds clear to auscultation           OUTSIDE LABS:  CBC:   Lab Results   Component Value Date    WBC 5.8 08/31/2022    WBC 7.7 03/11/2022    HGB 15.9 08/31/2022    HGB 16.2 03/11/2022    HCT 45.4 08/31/2022    HCT 47.6 03/11/2022     08/31/2022     03/11/2022     BMP:   Lab Results   Component Value Date     03/11/2022     03/09/2020    POTASSIUM 4.1 03/11/2022    POTASSIUM 4.4 03/09/2020    CHLORIDE 102 03/11/2022    CHLORIDE 103 03/09/2020    CO2 24 03/11/2022    CO2 22 03/09/2020    BUN 18 03/11/2022    BUN 13 03/09/2020    CR 0.73 08/31/2022    CR 0.81 03/11/2022     03/11/2022     03/09/2020     COAGS:   Lab Results   Component Value Date    PTT 33 02/26/2010    INR 1.36 (H) 11/03/2012     POC: No results found for: BGM, HCG, HCGS  HEPATIC:   Lab Results   Component Value Date    ALBUMIN 4.7 08/31/2022    PROTTOTAL 6.8 03/09/2020    ALT 26 08/31/2022    AST 19 03/09/2020    ALKPHOS 79 03/09/2020    BILITOTAL 0.6 03/09/2020     OTHER:   Lab Results   Component Value Date    A1C 5.2 08/31/2022    MILAGROS 9.9 03/11/2022       Anesthesia Plan    ASA Status:  2      Anesthesia Type: General.     - Airway: ETT   Induction: Intravenous, Propofol.   Maintenance: TIVA.        Consents    Anesthesia Plan(s) and associated risks,  benefits, and realistic alternatives discussed. Questions answered and patient/representative(s) expressed understanding.    - Discussed:     - Discussed with:  Patient      - Extended Intubation/Ventilatory Support Discussed: No.      - Patient is DNR/DNI Status: No    Use of blood products discussed: No .     Postoperative Care    Pain management: IV analgesics, Oral pain medications, Multi-modal analgesia.   PONV prophylaxis: Ondansetron (or other 5HT-3), Dexamethasone or Solumedrol     Comments:    Other Comments: GAETT  Magnesium chandu op infusion  Antiemetics  Tylenol po pre op  Toradol at the end of case if okay with surgeon  Consider background propofol  Soft bite block            Eugene Zhang MD

## 2022-12-02 NOTE — OP NOTE
Operative Note    Name:  Low Waller  PCP:  Josh Noble  Procedure Date:  12/2/2022      Procedure(s):  HERNIORRHAPHY, INGUINAL, LAPAROSCOPIC - BILATERAL; HERNIORRHAPHY, UMBILICAL, OPEN; HERNIORRHAPHY, VENTRAL, OPEN  HERNIORRHAPHY, UMBILICAL, OPEN    Pre-Procedure Diagnosis:  Left inguinal hernia [K40.90]  Umbilical hernia without obstruction and without gangrene [K42.9]     Post-Procedure Diagnosis:    bilateral inguinal hernias, umbilical hernia, ventral hernia    Surgeon(s):  Dileep Palomo MD    @[facility]@    Anesthesia Type:  Not documented    Past Medical History:   Diagnosis Date     Bipolar affective (H)      Chronic gout      Depression      Hyperlipidemia      Motion sickness      Obese      Rosacea      Syncope     march 2012 work up done, no reason discovered       Patient Active Problem List    Diagnosis Date Noted     Left inguinal hernia 11/07/2022     Priority: Medium     Added automatically from request for surgery [id number]       Umbilical hernia without obstruction and without gangrene 11/07/2022     Priority: Medium     Added automatically from request for surgery [id number]       Elevated blood pressure reading without diagnosis of hypertension 11/11/2021     Priority: Medium     Class 2 obesity due to excess calories without serious comorbidity with body mass index (BMI) of 36.0 to 36.9 in adult 11/11/2021     Priority: Medium     Ocular rosacea 11/11/2021     Priority: Medium     Idiopathic chronic gout of foot without tophus, unspecified laterality 10/31/2012     Priority: Medium     Status post revision of total hip 10/31/2012     Priority: Medium     Primary osteoarthritis of right hip 10/24/2012     Priority: Medium       Findings:  Indirect left inguinal and direct   Direct right inguinal hernia  2.5 cm ventral hernia  1 cm umbilical hernia    Operative Report:    Consent was obtained and the patient was taken to the operating room.  Gen. anesthesia was administered by the  anesthesia staff.  The briefing and timeout was performed by the OR staff.  The patient was prepped and draped in a sterile manner after a Rolon was placed.  The incision was made opposite the hernia at the umbilicus.  This was taken down through the anterior fascia of the rectus sheath.  A dissecting port was placed to the pubic tubercle under direct visualization.  This was dilated to its full diameter under direct visualization and removed.  An operating scope was placed in the preperitoneal space was insufflated to a pressure of 15 mmHg of CO2.  Two 5 mm trochars were placed on the midline under direct visualization of the scope.  The landmarks of the pelvis were identified the pubic tubercle, Art's ligament and the cord and vascular structures.  The hernia sac was reduced internally.  This was done with blunt and sharp dissection.  Pro  mesh was rolled and placed in the preperitoneal space covering the pubic tubercle, Art's ligament, the cord and vascular structures. This was repeated on the opposite side. 40 mL of Marcaine was injected in the space.  The mesh lied in good position and the CO2 and trochars were removed.  The anterior abdominal wall defect was closed with a 0 Vicryl figure-of-eight suture.  I then made an incision superior to the umbilicus.  At this time point I can see there is not only an umbilical hernia but also a ventral hernia.  I dissected out both hernia sacs these were both reduced I closed the umbilical hernia which is quite small about 1 cm primary with an 0 Ethibond suture figure-of-eight x1.  I then also then placed a piece a Ventralex mesh this was a full 4 cm mesh this was placed through the abdominal wall fascia and the preperitoneal space below tightly against the fascial wall I sutured this in place and closed the defect primarily with 0 Ethibond suture figure-of-eight x3.  Upon completion of this I closed the deep layers with 3-0 Vicryl suture of both of this  incision and then closed them this with a Monocryl subcuticular stitch.  Steri-Strips and sterile dressings were applied all the wounds all the incisions were closed with 4-0 Monocryl suture in a subcuticular fashion.  Steri-Strips and sterile dressings and 20 mL of Marcaine were injected in the incisions.  15 mg of Toradol were given IV by anesthesia.  The patient was extubated, the Rolon removed and transferred to the PACU in stable condition.  All sponge and needle counts are correct.    Estimated Blood Loss:   5 ml    Specimens:    none       Drains:   Urethral Catheter 12/02/22 Non-latex 16 fr (Active)       Complications:    None    Dileep Palomo MD     Date: 12/2/2022  Time: 9:23 AM

## 2022-12-02 NOTE — DISCHARGE INSTRUCTIONS
Take tylenol and ibuprofen every 6 hours as your main form of pain control.     Acetaminophen (Tylenol): Next Dose: 1:40 pm. Take 650-1000 mg every 6 hours for mild to moderate pain.    Ibuprofen (Motrin, Advil): Next Dose: 3:15 pm. Take 600 mg every 6 hours for mild to moderate pain.    Do not exceed 4,000 mg of acetaminophen during a 24 hour period  or 1000 mg per dose. Keep in mind that acetaminophen can also be found in many over-the-counter cold medications as well as narcotics that may be given for pain.     Narcotics:    Hydrocodone-acetaminophen: Next Dose: 4:30 pm Take 1-2 tab(s) every 6 hours for moderate to severe pain not controlled with tylenol and ibuprofen.    Senna-Docusate (Stool Softener): Next dose: When you get home. Take as instructed on the bottle. This is an over-the-counter medication. Take this to prevent constipation.    If you have any questions or concerns regarding your procedure, please contact Dr. Palomo, his office number is [phone].    Adult Discharge Orders & Instructions     For 24 hours after surgery    Get plenty of rest.  A responsible adult must stay with you for at least 24 hours after you leave the hospital.   Do not drive or use heavy equipment.  If you have weakness or tingling, don't drive or use heavy equipment until this feeling goes away.  Do not drink alcohol.  Avoid strenuous or risky activities.  Ask for help when climbing stairs.   You may feel lightheaded.  IF so, sit for a few minutes before standing.  Have someone help you get up.   If you have nausea (feel sick to your stomach): Drink only clear liquids such as apple juice, ginger ale, broth or 7-Up.  Rest may also help.  Be sure to drink enough fluids.  Move to a regular diet as you feel able.  You may have a slight fever. Call the doctor if your fever is over 100 F (37.7 C) (taken under the tongue) or lasts longer than 24 hours.  You may have a dry mouth, a sore throat, muscle aches or trouble  sleeping.  These should go away after 24 hours.  Do not make important or legal decisions.     Call your doctor for any of the followin.  Signs of infection (fever, growing tenderness at the surgery site, a large amount of drainage or bleeding, severe pain, foul-smelling drainage, redness, swelling).    2. It has been over 8 to 10 hours since surgery and you are still not able to urinate (pass water).    3.  Headache for over 24 hours.    Apply ice every hour while awake for 15-20 minutes at a time for the first 24-48 hours

## 2022-12-02 NOTE — ANESTHESIA CARE TRANSFER NOTE
Patient: Low Waller    Procedure: Procedure(s):  HERNIORRHAPHY, INGUINAL, LAPAROSCOPIC - BILATERAL; HERNIORRHAPHY, UMBILICAL, OPEN; HERNIORRHAPHY, VENTRAL, OPEN  HERNIORRHAPHY, UMBILICAL, OPEN       Diagnosis: Left inguinal hernia [K40.90]  Umbilical hernia without obstruction and without gangrene [K42.9]  Diagnosis Additional Information: No value filed.    Anesthesia Type:   General     Note:    Oropharynx: oropharynx clear of all foreign objects  Level of Consciousness: awake  Oxygen Supplementation: face mask  Level of Supplemental Oxygen (L/min / FiO2): 6  Independent Airway: airway patency satisfactory and stable  Dentition: dentition unchanged  Vital Signs Stable: post-procedure vital signs reviewed and stable  Report to RN Given: handoff report given  Patient transferred to: PACU    Handoff Report: Identifed the Patient, Identified the Reponsible Provider, Reviewed the pertinent medical history, Discussed the surgical course, Reviewed Intra-OP anesthesia mangement and issues during anesthesia, Set expectations for post-procedure period and Allowed opportunity for questions and acknowledgement of understanding      Vitals:  Vitals Value Taken Time   BP     Temp     Pulse 63 12/02/22 0935   Resp     SpO2 95 % 12/02/22 0935   Vitals shown include unvalidated device data.    Electronically Signed By: ELENI Mayfield CRNA  December 2, 2022  9:38 AM

## 2022-12-02 NOTE — ANESTHESIA POSTPROCEDURE EVALUATION
Patient: Low Waller    Procedure: Procedure(s):  HERNIORRHAPHY, INGUINAL, LAPAROSCOPIC - BILATERAL; HERNIORRHAPHY, UMBILICAL, OPEN; HERNIORRHAPHY, VENTRAL, OPEN  HERNIORRHAPHY, UMBILICAL, OPEN       Anesthesia Type:  General    Note:  Disposition: Outpatient   Postop Pain Control: Uneventful            Sign Out: Well controlled pain   PONV: No   Neuro/Psych: Uneventful            Sign Out: Acceptable/Baseline neuro status   Airway/Respiratory: Uneventful            Sign Out: Acceptable/Baseline resp. status   CV/Hemodynamics: Uneventful            Sign Out: Acceptable CV status; No obvious hypovolemia; No obvious fluid overload   Other NRE:    DID A NON-ROUTINE EVENT OCCUR?            Last vitals:  Vitals Value Taken Time   /75 12/02/22 1045   Temp 96.8  F (36  C) 12/02/22 0958   Pulse 63 12/02/22 1047   Resp 16 12/02/22 1030   SpO2 92 % 12/02/22 1047   Vitals shown include unvalidated device data.    Electronically Signed By: Eugene Zhang MD  December 2, 2022  1:32 PM

## 2022-12-16 ENCOUNTER — OFFICE VISIT (OUTPATIENT)
Dept: SURGERY | Facility: CLINIC | Age: 62
End: 2022-12-16
Payer: COMMERCIAL

## 2022-12-16 VITALS — DIASTOLIC BLOOD PRESSURE: 76 MMHG | SYSTOLIC BLOOD PRESSURE: 138 MMHG

## 2022-12-16 DIAGNOSIS — K40.90 LEFT INGUINAL HERNIA: Primary | ICD-10-CM

## 2022-12-16 DIAGNOSIS — K42.9 UMBILICAL HERNIA WITHOUT OBSTRUCTION AND WITHOUT GANGRENE: ICD-10-CM

## 2022-12-16 PROCEDURE — 99024 POSTOP FOLLOW-UP VISIT: CPT | Performed by: SPECIALIST

## 2022-12-16 NOTE — LETTER
12/16/2022         RE: Low Waller  [address] [place] [address]        Dear Colleague,    Thank you for referring your patient, Low Waller, to the [facility] [place]. Please see a copy of my visit note below.    HPI: Pt is here for follow up of a lap inguinal hernia repair and ventral hernia repair.  He is doing well. His appetite is good, and bowel function regular.  No fevers or chills. Ambulating without problems.       There were no vitals taken for this visit.      EXAM: This is a  62 year old MAN in no distress  GENERAL: Appears well  CHEST clear  CVS S1S2 NSR  ABDOMEN: Soft, non-tender. incsions helaed nicely, repiars intact  EXT: warm, moves without difficulty         Assessment/Plan:   Inguinal hernia  Ventral/umbilical hernia    S/p repair doing well    Can return to activities as tolerates.  Discussed answer questions  Return as needed    No follow-ups on file.    Dileep Palomo MD ,MD  [facility] Department of Surgery      Again, thank you for allowing me to participate in the care of your patient.        Sincerely,        Dileep Palomo MD

## 2022-12-16 NOTE — PROGRESS NOTES
HPI: Pt is here for follow up of a lap inguinal hernia repair and ventral hernia repair.  He is doing well. His appetite is good, and bowel function regular.  No fevers or chills. Ambulating without problems.       There were no vitals taken for this visit.      EXAM: This is a  62 year old MAN in no distress  GENERAL: Appears well  CHEST clear  CVS S1S2 NSR  ABDOMEN: Soft, non-tender. incsions helaed nicely, repiars intact  EXT: warm, moves without difficulty         Assessment/Plan:   Inguinal hernia  Ventral/umbilical hernia    S/p repair doing well    Can return to activities as tolerates.  Discussed answer questions  Return as needed    No follow-ups on file.    Dileep Palomo MD ,MD  [facility] Department of Surgery

## 2022-12-26 ENCOUNTER — HEALTH MAINTENANCE LETTER (OUTPATIENT)
Age: 62
End: 2022-12-26

## 2023-01-18 ENCOUNTER — TRANSFERRED RECORDS (OUTPATIENT)
Dept: HEALTH INFORMATION MANAGEMENT | Facility: CLINIC | Age: 63
End: 2023-01-18

## 2023-03-10 ENCOUNTER — DOCUMENTATION ONLY (OUTPATIENT)
Dept: LAB | Facility: CLINIC | Age: 63
End: 2023-03-10
Payer: COMMERCIAL

## 2023-03-10 DIAGNOSIS — M1A.00X0 CHRONIC GOUTY ARTHROPATHY: Primary | ICD-10-CM

## 2023-03-10 NOTE — PROGRESS NOTES
Low Waller has an upcoming Lab appointment.      Future Appointments   Date Time Provider Department Center   3/20/2023  1:00 PM [facility] LAB [facility]   5/24/2023 11:00 AM Yudith Lazo MBBS [facility]U [facility]   10/20/2023  1:45 PM Emilee Villatoro, MARVIN [facility]       No orders are currently in the chart from a Rheumatology Provider     Please place orders or advise.    Thanks,     Jazzy Ybarra

## 2023-03-20 ENCOUNTER — LAB (OUTPATIENT)
Dept: LAB | Facility: CLINIC | Age: 63
End: 2023-03-20
Payer: COMMERCIAL

## 2023-03-20 DIAGNOSIS — M1A.00X0 CHRONIC GOUTY ARTHROPATHY: ICD-10-CM

## 2023-03-20 LAB
ALBUMIN SERPL BCG-MCNC: 4.5 G/DL (ref 3.5–5.2)
ALT SERPL W P-5'-P-CCNC: 21 U/L (ref 10–50)
CREAT SERPL-MCNC: 0.96 MG/DL (ref 0.67–1.17)
ERYTHROCYTE [DISTWIDTH] IN BLOOD BY AUTOMATED COUNT: 13.2 % (ref 10–15)
GFR SERPL CREATININE-BSD FRML MDRD: 89 ML/MIN/1.73M2
HCT VFR BLD AUTO: 42.1 % (ref 40–53)
HGB BLD-MCNC: 14.9 G/DL (ref 13.3–17.7)
MCH RBC QN AUTO: 31.3 PG (ref 26.5–33)
MCHC RBC AUTO-ENTMCNC: 35.4 G/DL (ref 31.5–36.5)
MCV RBC AUTO: 88 FL (ref 78–100)
PLATELET # BLD AUTO: 209 10E3/UL (ref 150–450)
RBC # BLD AUTO: 4.76 10E6/UL (ref 4.4–5.9)
URATE SERPL-MCNC: 4.3 MG/DL (ref 3.4–7)
WBC # BLD AUTO: 5.6 10E3/UL (ref 4–11)

## 2023-03-20 PROCEDURE — 36415 COLL VENOUS BLD VENIPUNCTURE: CPT

## 2023-03-20 PROCEDURE — 82565 ASSAY OF CREATININE: CPT

## 2023-03-20 PROCEDURE — 84550 ASSAY OF BLOOD/URIC ACID: CPT

## 2023-03-20 PROCEDURE — 84460 ALANINE AMINO (ALT) (SGPT): CPT

## 2023-03-20 PROCEDURE — 85027 COMPLETE CBC AUTOMATED: CPT

## 2023-03-20 PROCEDURE — 82040 ASSAY OF SERUM ALBUMIN: CPT

## 2023-05-31 ENCOUNTER — OFFICE VISIT (OUTPATIENT)
Dept: RHEUMATOLOGY | Facility: CLINIC | Age: 63
End: 2023-05-31
Payer: COMMERCIAL

## 2023-05-31 VITALS
BODY MASS INDEX: 33.14 KG/M2 | DIASTOLIC BLOOD PRESSURE: 64 MMHG | HEART RATE: 84 BPM | WEIGHT: 221.2 LBS | SYSTOLIC BLOOD PRESSURE: 130 MMHG

## 2023-05-31 DIAGNOSIS — M1A.00X0 CHRONIC GOUTY ARTHROPATHY: Primary | ICD-10-CM

## 2023-05-31 DIAGNOSIS — M15.0 PRIMARY OSTEOARTHRITIS INVOLVING MULTIPLE JOINTS: ICD-10-CM

## 2023-05-31 DIAGNOSIS — Z96.649 STATUS POST REVISION OF TOTAL HIP: ICD-10-CM

## 2023-05-31 PROCEDURE — 99214 OFFICE O/P EST MOD 30 MIN: CPT | Performed by: INTERNAL MEDICINE

## 2023-05-31 RX ORDER — ALLOPURINOL 300 MG/1
2 TABLET ORAL DAILY
Qty: 180 TABLET | Refills: 1 | Status: SHIPPED | OUTPATIENT
Start: 2023-05-31 | End: 2023-07-06

## 2023-05-31 NOTE — PROGRESS NOTES
Rheumatology follow-up visit note     Low is a 62 year old male presents today for follow-up.    Low was seen today for follow up.    Diagnoses and all orders for this visit:    Chronic gouty arthropathy  -     allopurinol (ZYLOPRIM) 300 MG tablet; Take 2 tablets (600 mg) by mouth daily for 90 days    Primary osteoarthritis involving multiple joints    Status post revision of total hip        Well-controlled gout, osteoarthritis status post bilateral hip replacement occasional right hip discomfort.  Acetaminophen, strengthening exercises, if needed duloxetine.  Some concern around the cost of medications.  Various options reviewed.  We will meet here in 12 months labs every 6.    Follow up in 1 year.    HPI    Low Waller is a 62 year old male is here for follow-up of gout, crystal proven.  Over the past year he has done well with regards to gout without flareups, taking allopurinol 600 mg daily serum urate 4.9 that despite some weight gain secondary to Covid related reduced mobility that he is trying to counter   His wife is a nurse is done well with the breast cancer and has given up hospital job to work at [employer].  He has osteoarthritis.  Occasional knee pain.  Bilateral hip replacement.  He has retired from his job at [employer].  He has been working out regularly doing barbells, swimming.  Recent labs are within normal range.    DETAILED EXAMINATION  05/31/23  :    Vitals:    05/31/23 0849   BP: 130/64   BP Location: Right arm   Patient Position: Sitting   Pulse: 84   Weight: 100.3 kg (221 lb 3.2 oz)     Alert oriented. Head including the face is examined for malar rash, heliotropes, scarring, lupus pernio. Eyes examined for redness such as in episcleritis/scleritis, periorbital lesions.   Neck/ Face examined for parotid gland swelling, range of motion of neck.  Left upper and lower and right upper and lower extremities examined for tenderness, swelling, warmth of the  appendicular joints, range of motion, edema, rash.  Some of the important findings included: he does not have evidence of synovitis in the palpable joints of the upper extremities.  No significant deformities of the digits.  no Heberden nodes.  Range of motion of the shoulders  show full abduction.  No JLT effusion or warmth of the knees.  he does not have dactylitis of the digits.     Patient Active Problem List    Diagnosis Date Noted     Left inguinal hernia 11/07/2022     Priority: Medium     Added automatically from request for surgery [id number]       Umbilical hernia without obstruction and without gangrene 11/07/2022     Priority: Medium     Added automatically from request for surgery [id number]       Elevated blood pressure reading without diagnosis of hypertension 11/11/2021     Priority: Medium     Class 2 obesity due to excess calories without serious comorbidity with body mass index (BMI) of 36.0 to 36.9 in adult 11/11/2021     Priority: Medium     Ocular rosacea 11/11/2021     Priority: Medium     Idiopathic chronic gout of foot without tophus, unspecified laterality 10/31/2012     Priority: Medium     Status post revision of total hip 10/31/2012     Priority: Medium     Primary osteoarthritis of right hip 10/24/2012     Priority: Medium     Past Surgical History:   Procedure Laterality Date     ARTHROPLASTY HIP  10/31/2012    Procedure: ARTHROPLASTY HIP;  RIGHT TOTAL HIP ARTHROPLASTY (SMITH & NEPHEW OXINIUM);  Surgeon: Adama Flores MD;  Location:  OR     ARTHROPLASTY REVISION HIP  10/31/2012    Procedure: ARTHROPLASTY REVISION HIP;  REVISION RIGHT HIP - ACETABULAR COMPONENT and femoral head- MARY & NEPHEW;  Surgeon: Adama Flores MD;  Location: [facility] OR     CATARACT EXTRACTION Bilateral      DERMABRASION FACE       ENT SURGERY      tonsillectomy     EYE SURGERY      florin cataracts     HERNIORRHAPHY UMBILICAL N/A 12/2/2022    Procedure: HERNIORRHAPHY, UMBILICAL, OPEN;  Surgeon:  Dileep Palomo MD;  Location: [facility] OR     JOINT REPLACEMENT Bilateral     hip     LAPAROSCOPIC HERNIORRHAPHY INGUINAL Bilateral 12/2/2022    Procedure: HERNIORRHAPHY, INGUINAL, LAPAROSCOPIC - BILATERAL; HERNIORRHAPHY, UMBILICAL, OPEN; HERNIORRHAPHY, VENTRAL, OPEN;  Surgeon: Dileep Palomo MD;  Location: [facility] OR     ORTHOPEDIC SURGERY      (L) hip resurfacing     TONSILLECTOMY        Past Medical History:   Diagnosis Date     Bipolar affective (H)      Chronic gout      Depression      Hyperlipidemia      Motion sickness      Obese      Rosacea      Syncope     march 2012 work up done, no reason discovered     Allergies   Allergen Reactions     Meperidine Rash     Other reaction(s): Hives     Bee Venom      Current Outpatient Medications   Medication Sig Dispense Refill     allopurinol (ZYLOPRIM) 300 MG tablet TAKE 2 TABLETS (600 MG) BY MOUTH DAILY       cycloSPORINE (RESTASIS) 0.05 % ophthalmic emulsion Place 1 drop into both eyes       doxycycline hyclate (VIBRA-TABS) 100 MG tablet Take 1 tablet by mouth 2 times daily       erythromycin (ROMYCIN) 5 MG/GM ophthalmic ointment APPLY 0.25 INCH RIBBON TO BOTH EYES EVERY EVENING       family history includes Cerebrovascular Disease in his father and mother; Dementia in his mother.  Social Connections: Not on file          WBC Count   Date Value Ref Range Status   03/20/2023 5.6 4.0 - 11.0 10e3/uL Final     RBC Count   Date Value Ref Range Status   03/20/2023 4.76 4.40 - 5.90 10e6/uL Final     Hemoglobin   Date Value Ref Range Status   03/20/2023 14.9 13.3 - 17.7 g/dL Final   11/02/2012 9.1 (L) 13.3 - 17.7 g/dL Final     Hematocrit   Date Value Ref Range Status   03/20/2023 42.1 40.0 - 53.0 % Final     MCV   Date Value Ref Range Status   03/20/2023 88 78 - 100 fL Final     MCH   Date Value Ref Range Status   03/20/2023 31.3 26.5 - 33.0 pg Final     Platelet Count   Date Value Ref Range Status   03/20/2023 209 150 - 450 10e3/uL Final     AST    Date Value Ref Range Status   03/09/2020 19 0 - 40 U/L Final     ALT   Date Value Ref Range Status   03/20/2023 21 10 - 50 U/L Final     Albumin   Date Value Ref Range Status   03/20/2023 4.5 3.5 - 5.2 g/dL Final   03/11/2022 4.4 3.5 - 5.0 g/dL Final     Alkaline Phosphatase   Date Value Ref Range Status   03/09/2020 79 45 - 120 U/L Final     Creatinine   Date Value Ref Range Status   03/20/2023 0.96 0.67 - 1.17 mg/dL Final   02/26/2010 0.83 0.66 - 1.25 mg/dL Final     Comment:     New IDMS-traceable calibration  beginning 5/1/08     GFR Estimate   Date Value Ref Range Status   03/20/2023 89 >60 mL/min/1.73m2 Final     Comment:     eGFR calculated using 2021 CKD-EPI equation.   03/08/2021 >60 >60 mL/min/1.73m2 Final   02/26/2010 >90 >60 mL/min/1.7m2 Final     GFR Estimate If Black   Date Value Ref Range Status   03/08/2021 >60 >60 mL/min/1.73m2 Final   02/26/2010 >90 >60 mL/min/1.7m2 Final

## 2023-06-08 ENCOUNTER — TELEPHONE (OUTPATIENT)
Dept: DERMATOLOGY | Facility: CLINIC | Age: 63
End: 2023-06-08
Payer: COMMERCIAL

## 2023-06-08 NOTE — TELEPHONE ENCOUNTER
lvm my chart msg for patient to call and rescheduled the following:    Appointment type: Return  Provider: Emilee Villatoro  Return date: 10-20-23  Specialty phone number: [phone]

## 2023-07-06 ENCOUNTER — TELEPHONE (OUTPATIENT)
Dept: RHEUMATOLOGY | Facility: CLINIC | Age: 63
End: 2023-07-06
Payer: COMMERCIAL

## 2023-07-06 DIAGNOSIS — M1A.00X0 CHRONIC GOUTY ARTHROPATHY: ICD-10-CM

## 2023-07-06 RX ORDER — ALLOPURINOL 300 MG/1
2 TABLET ORAL DAILY
Qty: 180 TABLET | Refills: 1 | Status: SHIPPED | OUTPATIENT
Start: 2023-07-06 | End: 2024-05-29

## 2023-07-06 NOTE — TELEPHONE ENCOUNTER
[facility] Call Center    Phone Message    May a detailed message be left on voicemail: yes     Reason for Call: Medication Refill Request    Has the patient contacted the pharmacy for the refill? Yes   Name of medication being requested: Allopurinol  Provider who prescribed the medication: Violet  Pharmacy: [facility]  Date medication is needed: Routine     Action Taken: Message routed to:  Other: RHEUMATOLOGY SUPPORT POOL    Travel Screening: Not Applicable

## 2023-09-08 ENCOUNTER — TRANSFERRED RECORDS (OUTPATIENT)
Dept: HEALTH INFORMATION MANAGEMENT | Facility: CLINIC | Age: 63
End: 2023-09-08
Payer: COMMERCIAL

## 2023-11-30 ENCOUNTER — LAB (OUTPATIENT)
Dept: LAB | Facility: CLINIC | Age: 63
End: 2023-11-30
Payer: COMMERCIAL

## 2023-11-30 DIAGNOSIS — M1A.00X0 CHRONIC GOUTY ARTHROPATHY: ICD-10-CM

## 2023-11-30 LAB
ALBUMIN SERPL BCG-MCNC: 4.2 G/DL (ref 3.5–5.2)
ALT SERPL W P-5'-P-CCNC: 29 U/L (ref 0–70)
CREAT SERPL-MCNC: 0.93 MG/DL (ref 0.67–1.17)
EGFRCR SERPLBLD CKD-EPI 2021: >90 ML/MIN/1.73M2
ERYTHROCYTE [DISTWIDTH] IN BLOOD BY AUTOMATED COUNT: 13.2 % (ref 10–15)
HCT VFR BLD AUTO: 46.2 % (ref 40–53)
HGB BLD-MCNC: 15.8 G/DL (ref 13.3–17.7)
MCH RBC QN AUTO: 31.3 PG (ref 26.5–33)
MCHC RBC AUTO-ENTMCNC: 34.2 G/DL (ref 31.5–36.5)
MCV RBC AUTO: 92 FL (ref 78–100)
PLATELET # BLD AUTO: 199 10E3/UL (ref 150–450)
RBC # BLD AUTO: 5.04 10E6/UL (ref 4.4–5.9)
URATE SERPL-MCNC: 4.8 MG/DL (ref 3.4–7)
WBC # BLD AUTO: 6.5 10E3/UL (ref 4–11)

## 2023-11-30 PROCEDURE — 85027 COMPLETE CBC AUTOMATED: CPT

## 2023-11-30 PROCEDURE — 36415 COLL VENOUS BLD VENIPUNCTURE: CPT

## 2023-11-30 PROCEDURE — 84460 ALANINE AMINO (ALT) (SGPT): CPT

## 2023-11-30 PROCEDURE — 84550 ASSAY OF BLOOD/URIC ACID: CPT

## 2023-11-30 PROCEDURE — 82565 ASSAY OF CREATININE: CPT

## 2023-11-30 PROCEDURE — 82040 ASSAY OF SERUM ALBUMIN: CPT

## 2024-02-04 ENCOUNTER — HEALTH MAINTENANCE LETTER (OUTPATIENT)
Age: 64
End: 2024-02-04

## 2024-02-13 SDOH — HEALTH STABILITY: PHYSICAL HEALTH: ON AVERAGE, HOW MANY DAYS PER WEEK DO YOU ENGAGE IN MODERATE TO STRENUOUS EXERCISE (LIKE A BRISK WALK)?: 5 DAYS

## 2024-02-13 SDOH — HEALTH STABILITY: PHYSICAL HEALTH: ON AVERAGE, HOW MANY MINUTES DO YOU ENGAGE IN EXERCISE AT THIS LEVEL?: 70 MIN

## 2024-02-13 ASSESSMENT — SOCIAL DETERMINANTS OF HEALTH (SDOH): HOW OFTEN DO YOU GET TOGETHER WITH FRIENDS OR RELATIVES?: TWICE A WEEK

## 2024-02-15 ENCOUNTER — OFFICE VISIT (OUTPATIENT)
Dept: FAMILY MEDICINE | Facility: CLINIC | Age: 64
End: 2024-02-15
Payer: COMMERCIAL

## 2024-02-15 VITALS
TEMPERATURE: 97.4 F | SYSTOLIC BLOOD PRESSURE: 132 MMHG | HEIGHT: 68 IN | OXYGEN SATURATION: 97 % | BODY MASS INDEX: 35.16 KG/M2 | DIASTOLIC BLOOD PRESSURE: 70 MMHG | HEART RATE: 100 BPM | RESPIRATION RATE: 17 BRPM | WEIGHT: 232 LBS

## 2024-02-15 DIAGNOSIS — M1A.9XX0 CHRONIC GOUT INVOLVING TOE OF LEFT FOOT WITHOUT TOPHUS, UNSPECIFIED CAUSE: ICD-10-CM

## 2024-02-15 DIAGNOSIS — Z00.00 ROUTINE PHYSICAL EXAMINATION: Primary | ICD-10-CM

## 2024-02-15 DIAGNOSIS — D12.6 SERRATED ADENOMA OF COLON: ICD-10-CM

## 2024-02-15 DIAGNOSIS — Z23 ENCOUNTER FOR IMMUNIZATION: ICD-10-CM

## 2024-02-15 DIAGNOSIS — E66.09 CLASS 2 OBESITY DUE TO EXCESS CALORIES WITHOUT SERIOUS COMORBIDITY WITH BODY MASS INDEX (BMI) OF 35.0 TO 35.9 IN ADULT: ICD-10-CM

## 2024-02-15 DIAGNOSIS — E66.812 CLASS 2 OBESITY DUE TO EXCESS CALORIES WITHOUT SERIOUS COMORBIDITY WITH BODY MASS INDEX (BMI) OF 35.0 TO 35.9 IN ADULT: ICD-10-CM

## 2024-02-15 DIAGNOSIS — Z12.5 SCREENING FOR PROSTATE CANCER: ICD-10-CM

## 2024-02-15 DIAGNOSIS — L71.8 OCULAR ROSACEA: ICD-10-CM

## 2024-02-15 DIAGNOSIS — E78.5 HYPERLIPIDEMIA LDL GOAL <100: ICD-10-CM

## 2024-02-15 DIAGNOSIS — R03.0 ELEVATED BLOOD PRESSURE READING WITHOUT DIAGNOSIS OF HYPERTENSION: ICD-10-CM

## 2024-02-15 PROCEDURE — 90678 RSV VACC PREF BIVALENT IM: CPT | Performed by: FAMILY MEDICINE

## 2024-02-15 PROCEDURE — 90471 IMMUNIZATION ADMIN: CPT | Performed by: FAMILY MEDICINE

## 2024-02-15 PROCEDURE — 99396 PREV VISIT EST AGE 40-64: CPT | Mod: 25 | Performed by: FAMILY MEDICINE

## 2024-02-15 PROCEDURE — 90472 IMMUNIZATION ADMIN EACH ADD: CPT | Performed by: FAMILY MEDICINE

## 2024-02-15 PROCEDURE — 90750 HZV VACC RECOMBINANT IM: CPT | Performed by: FAMILY MEDICINE

## 2024-02-15 ASSESSMENT — PAIN SCALES - GENERAL: PAINLEVEL: NO PAIN (0)

## 2024-02-15 NOTE — PROGRESS NOTES
"    Assessment/Plan:     Routine physical examination  Routine healthcare maintenance.  Preventative cares reviewed.  Annual physical exams to continue.    Class 2 obesity due to excess calories without serious comorbidity with body mass index (BMI) of 35.0 to 35.9 in adult  Dietary and exercise modifications reviewed for weight goal remaining less than 220 pounds initially, less than 200 pounds ideally.    Ocular rosacea  Ocular rosacea with ongoing management with cyclosporine drops, doxycycline 100 mg daily and erythromycin ointment.    Chronic gout involving toe of left foot without tophus, unspecified cause  Asymptomatic currently.  No recent recurrence.  Continue allopurinol 300 mg using 2 capsules daily.    Elevated blood pressure reading without diagnosis of hypertension  Blood pressure elevation historically without hypertension.  Blood pressure 132/70 on recheck today.    Serrated adenoma of colon  Colonoscopy was reviewed from January 12, 2022 with 3-year follow-up recommendation with \"advanced adenoma\" noted with serrated adenoma and tubular adenomas present.       The following high BMI interventions were performed this visit: encouragement to exercise, weight monitoring, weight loss from baseline weight, and lifestyle education regarding diet          Subjective:     Low Waller is a 63 year old male who presents for an annual exam.  In general doing well.  Has been exercising with resistance training over the past couple years but not getting consistent cardiovascular activity per se.  History of ocular rosacea and continues cyclosporine drops plus doxycycline 100 mg daily and erythromycin ointment as directed.  History of chronic gout.  Prior left 1st MTP joint involvement initially.  Allopurinol 600 mg once daily continuing.  Blood pressure elevation without hypertension.  Needs RSV vaccination and Shingrix immunization series.  Known history of hyperlipidemia and is nonfasting.  Does have " "scheduled fasting labs in May of this year.  Comprehensive review of systems as above otherwise all negative.      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   No children (wife lost a child at 6 weeks of pregnancy...)   No smoke (quit 1995, prior intermittent x 15 years < 1/4 ppd)   5 drinks / week, less now with h/o gout   Mom - dec CVA, Alzeihmer's   Dad - dec CVA   2 older bro -   1 older sis - uterine CA, doing fine currently   [employer] -  - [employer] center (will retire no later than 4/1/2021)   Hospitalized x 4 bipolar d/o 1980's   Surgeries: s/p tonsilectomy, dermabrasion, bilateral cataracts (2001); left hip resurfacing 2/26/2010 (Dr. Flores); 10/31/12 right BRUNA (Dr. Flores); 12/2/22 scheduled left inguinal hernia repair and umbilical hernia repair (Dr. Palomo)   Volunteer: \"[employer]\" (read with a 10 y.o. for 1 hour every Sunday, but she is painfully shy...)   Raised Ep[employer]alian but left Rastafarian (believe in God)   PHQ-9 = 2/27 (10/17/12)       Healthy Habits:   HPI     Immunization History   Administered Date(s) Administered    COVID-19 12+ (2023-24) (Pfizer) 11/13/2023    COVID-19 Bivalent 18+ (Moderna) 11/29/2022    COVID-19 Monovalent 18+ (Moderna) 03/20/2021, 04/17/2021    COVID-19 Monovalent Booster 18+ (Moderna) 11/11/2021    DT (PEDS <7y) 09/12/2000    Flu, Unspecified 12/01/2019    Influenza (IIV3) PF 11/22/2005, 12/01/2009    Influenza Vaccine >6 months,quad, PF 12/11/2019, 10/24/2020, 10/03/2021, 11/04/2022, 11/13/2023    Influenza Vaccine, 6+MO IM (QUADRIVALENT W/PRESERVATIVES) 12/01/2009    TDAP (Adacel,Boostrix) 05/08/2007, 05/31/2017    TDAP Vaccine (Boostrix) 05/08/2007, 05/31/2017     Immunization status: Shingrix immunization provided with booster in 2 to 6 months.  RSV vaccination provided today.    Current Outpatient Medications   Medication Sig Dispense Refill    allopurinol (ZYLOPRIM) 300 MG tablet Take 2 tablets (600 " mg) by mouth daily 180 tablet 1    cycloSPORINE (RESTASIS) 0.05 % ophthalmic emulsion Place 1 drop into both eyes      doxycycline hyclate (VIBRA-TABS) 100 MG tablet Take 1 tablet by mouth daily      erythromycin (ROMYCIN) 5 MG/GM ophthalmic ointment APPLY 0.25 INCH RIBBON TO BOTH EYES EVERY EVENING       Past Medical History:   Diagnosis Date    Bipolar affective (H)     Chronic gout     Depression     Hyperlipidemia     Motion sickness     Obese     Rosacea     Syncope     march 2012 work up done, no reason discovered     Past Surgical History:   Procedure Laterality Date    ARTHROPLASTY HIP  10/31/2012    Procedure: ARTHROPLASTY HIP;  RIGHT TOTAL HIP ARTHROPLASTY (SMITH & NEPHEW OXINIUM);  Surgeon: Adama Flores MD;  Location:  OR    ARTHROPLASTY REVISION HIP  10/31/2012    Procedure: ARTHROPLASTY REVISION HIP;  REVISION RIGHT HIP - ACETABULAR COMPONENT and femoral head- MARY & NEPHEW;  Surgeon: Adama Flores MD;  Location:  OR    CATARACT EXTRACTION Bilateral     DERMABRASION FACE      ENT SURGERY      tonsillectomy    EYE SURGERY      florin cataracts    HERNIORRHAPHY UMBILICAL N/A 12/2/2022    Procedure: HERNIORRHAPHY, UMBILICAL, OPEN;  Surgeon: Dileep Palomo MD;  Location: [facility] OR    JOINT REPLACEMENT Bilateral     hip    LAPAROSCOPIC HERNIORRHAPHY INGUINAL Bilateral 12/2/2022    Procedure: HERNIORRHAPHY, INGUINAL, LAPAROSCOPIC - BILATERAL; HERNIORRHAPHY, UMBILICAL, OPEN; HERNIORRHAPHY, VENTRAL, OPEN;  Surgeon: Dileep Palomo MD;  Location: [facility] OR    ORTHOPEDIC SURGERY      (L) hip resurfacing    TONSILLECTOMY       Meperidine and Bee venom  Family History   Problem Relation Age of Onset    Dementia Mother     Cerebrovascular Disease Mother     Cerebrovascular Disease Father      Social History     Socioeconomic History    Marital status:      Spouse name: Not on file    Number of children: Not on file    Years of education: Not on file     Highest education level: Not on file   Occupational History    Not on file   Tobacco Use    Smoking status: Former     Packs/day: 0.50     Years: 18.00     Additional pack years: 0.00     Total pack years: 9.00     Types: Cigarettes     Quit date: 1995     Years since quittin.1    Smokeless tobacco: Never    Tobacco comments:     quit in    Substance and Sexual Activity    Alcohol use: Yes     Alcohol/week: 1.7 standard drinks of alcohol     Comment: Alcoholic Drinks/day: occasionally     Drug use: No    Sexual activity: Not on file   Other Topics Concern    Not on file   Social History Narrative    Not on file     Social Determinants of Health     Financial Resource Strain: Low Risk  (2024)    Financial Resource Strain     Within the past 12 months, have you or your family members you live with been unable to get utilities (heat, electricity) when it was really needed?: No   Food Insecurity: Low Risk  (2024)    Food Insecurity     Within the past 12 months, did you worry that your food would run out before you got money to buy more?: No     Within the past 12 months, did the food you bought just not last and you didn t have money to get more?: No   Transportation Needs: Low Risk  (2024)    Transportation Needs     Within the past 12 months, has lack of transportation kept you from medical appointments, getting your medicines, non-medical meetings or appointments, work, or from getting things that you need?: No   Physical Activity: Sufficiently Active (2024)    Exercise Vital Sign     Days of Exercise per Week: 5 days     Minutes of Exercise per Session: 70 min   Stress: No Stress Concern Present (2024)    [employer] - Occupational Stress Questionnaire     Feeling of Stress : Only a little   Social Connections: Unknown (2024)    Social Connection and Isolation Panel [NHANES]     Frequency of Communication with Friends and Family: Not on file  "    Frequency of Social Gatherings with Friends and Family: Twice a week     Attends Pentec[employer]ism Services: Not on file     Active Member of Clubs or Organizations: Not on file     Attends Club or Organization Meetings: Not on file     Marital Status: Not on file   Interpersonal Safety: Low Risk  (2/15/2024)    Interpersonal Safety     Do you feel physically and emotionally safe where you currently live?: Yes     Within the past 12 months, have you been hit, slapped, kicked or otherwise physically hurt by someone?: No     Within the past 12 months, have you been humiliated or emotionally abused in other ways by your partner or ex-partner?: No   Housing Stability: Low Risk  (2/13/2024)    Housing Stability     Do you have housing? : Yes     Are you worried about losing your housing?: No       Review of Systems  Comprehensive ROS: as above, otherwise all negative.           Objective:     /70   Pulse 100   Temp 97.4  F (36.3  C)   Resp 17   Ht 1.727 m (5' 8\")   Wt 105.2 kg (232 lb)   SpO2 97%   BMI 35.28 kg/m    Body mass index is 35.28 kg/m .    Physical    General Appearance:    Alert, cooperative, no distress, appears stated age.     Head:    Normocephalic, without obvious abnormality, atraumatic   Eyes:    PERRL, conjunctival injection otherwise with corneas clear, EOM's intact, fundi benign, both eyes        Ears:    Normal TM's and external ear canals, both ears   Nose:   Nares normal, septum midline, mucosa normal, no drainage    or sinus tenderness   Throat:   Lips, mucosa, and tongue normal; teeth and gums normal   Neck:   Supple, symmetrical, trachea midline, no adenopathy;        thyroid:  No enlargement/tenderness/nodules; no carotid    bruit or JVD   Back:     Symmetric, no curvature, ROM normal, no CVA tenderness   Lungs:     Clear to auscultation bilaterally, respirations unlabored   Chest wall:    No tenderness or deformity   Heart:    Regular rate and rhythm, S1 and S2 normal, no murmur, rub "   or gallop   Abdomen:     Soft, non-tender, bowel sounds active all four quadrants,     no masses, no organomegaly.     Genitalia:    Deferred status post recent herniorrhaphy procedure as noted.   Rectal:    Normal tone.  Prostate normal/symmetric, no masses or tenderness.   Extremities:   Extremities normal, atraumatic, no cyanosis or edema   Pulses:   2+ and symmetric all extremities   Skin:   Skin color, texture, turgor normal, no rashes or lesions   Lymph nodes:   Cervical, supraclavicular, and axillary nodes normal   Neurologic:   CNII-XII intact. Normal strength, sensation and reflexes       throughout                This note has been dictated using voice recognition software and as a result may contain minor grammatical errors and unintended word substitutions.

## 2024-02-16 ENCOUNTER — PATIENT OUTREACH (OUTPATIENT)
Dept: GASTROENTEROLOGY | Facility: CLINIC | Age: 64
End: 2024-02-16
Payer: COMMERCIAL

## 2024-02-27 ENCOUNTER — VIRTUAL VISIT (OUTPATIENT)
Dept: FAMILY MEDICINE | Facility: CLINIC | Age: 64
End: 2024-02-27
Payer: COMMERCIAL

## 2024-02-27 DIAGNOSIS — J20.9 ACUTE BRONCHITIS, UNSPECIFIED ORGANISM: Primary | ICD-10-CM

## 2024-02-27 DIAGNOSIS — R05.2 SUBACUTE COUGH: ICD-10-CM

## 2024-02-27 DIAGNOSIS — F30.9 BIPOLAR I DISORDER, SINGLE MANIC EPISODE (H): ICD-10-CM

## 2024-02-27 PROCEDURE — 99214 OFFICE O/P EST MOD 30 MIN: CPT | Mod: 95 | Performed by: FAMILY MEDICINE

## 2024-02-27 RX ORDER — CODEINE PHOSPHATE AND GUAIFENESIN 10; 100 MG/5ML; MG/5ML
1-2 SOLUTION ORAL EVERY 4 HOURS PRN
Qty: 237 ML | Refills: 0 | Status: SHIPPED | OUTPATIENT
Start: 2024-02-27 | End: 2024-05-29

## 2024-02-27 RX ORDER — AZITHROMYCIN 250 MG/1
TABLET, FILM COATED ORAL
Qty: 6 TABLET | Refills: 0 | Status: SHIPPED | OUTPATIENT
Start: 2024-02-27 | End: 2024-03-03

## 2024-02-27 NOTE — PROGRESS NOTES
"Will is a 63 year old who is being evaluated via a billable video visit.      How would you like to obtain your AVS? MyChart  If the video visit is dropped, the invitation should be resent by: Text to cell phone: [phone]  Will anyone else be joining your video visit? No          Assessment & Plan     Acute bronchitis, unspecified organism  Acute bronchitis concerns ongoing issues with sleep disruption and significant cough.  Z-Florian empirically.  Guaifenesin with codeine for cough as directed.  - azithromycin (ZITHROMAX) 250 MG tablet  Dispense: 6 tablet; Refill: 0    Subacute cough  As above, guaifenesin with codeine for cough suppression as discussed.  - guaiFENesin-codeine (ROBITUSSIN AC) 100-10 MG/5ML solution  Dispense: 237 mL; Refill: 0    Bipolar I disorder, single manic episode (H)  Underlying bipolar disorder, stable.              BMI  Estimated body mass index is 35.28 kg/m  as calculated from the following:    Height as of 2/15/24: 1.727 m (5' 8\").    Weight as of 2/15/24: 105.2 kg (232 lb).   Weight management plan: Discussed healthy diet and exercise guidelines          Subjective   Will is a 63 year old, presenting for the following health issues:  URI (Cough comes and goes - some mucus at times - started 4 weeks ago and is getting worse.  Pt did check for covid at the onset of symptoms)        2/27/2024     9:14 AM   Additional Questions   Roomed by      History of Present Illness       Reason for visit:  Sever cough that won't let me sleep  Symptom onset:  3-4 weeks ago  Symptoms include:  Severe cough  Symptom intensity:  Severe  Symptom progression:  Worsening  Had these symptoms before:  Yes  Has tried/received treatment for these symptoms:  Yes  Previous treatment was successful:  Yes  Prior treatment description:  I believe I was prescribed cough syrup  What makes it worse:  Laying down  What makes it better:  I'm just realiznig this is most likely bronchitis so breathing in steam from a " pot is very soothing. Vicks vaporub has been helpful    He eats 2-3 servings of fruits and vegetables daily.He consumes 2 sweetened beverage(s) daily.He exercises with enough effort to increase his heart rate 30 to 60 minutes per day.  He exercises with enough effort to increase his heart rate 5 days per week. He is missing 2 dose(s) of medications per week.  He is not taking prescribed medications regularly due to remembering to take.       Virtual visit completed with ongoing cough over the last month or so.  Had started prior to physical exam February 15, 2024 after exposure to a friend who had been smoking.  Hard to sleep on his back.  Better if sleeps on side.  Cough somewhat productive.  Feels like it is in upper chest.  Denies history of COPD or emphysema.  No asthma concerns.  Not on respiratory medication or inhalers typically.  No facial pain necessarily.  Scant postnasal drainage.  No headache currently.  Does utilize doxycycline for rosacea management.  Comprehensive review of systems as above otherwise all negative.        Review of Systems  Constitutional, HEENT, cardiovascular, pulmonary, GI, , musculoskeletal, neuro, skin, endocrine and psych systems are negative, except as otherwise noted.      Objective           Vitals:  No vitals were obtained today due to virtual visit.    Physical Exam   GENERAL: alert and no distress  EYES: Eyes grossly normal to inspection.  No discharge or erythema, or obvious scleral/conjunctival abnormalities.  RESP: No audible wheeze, cough, or visible cyanosis.    SKIN: Visible skin clear. No significant rash, abnormal pigmentation or lesions.  NEURO: Cranial nerves grossly intact.  Mentation and speech appropriate for age.  PSYCH: Appropriate affect, tone, and pace of words          Video-Visit Details    Type of service:  Video Visit     Originating Location (pt. Location): Home    Distant Location (provider location):  On-site  Platform used for Video Visit:  Casi  Signed Electronically by: Josh Noble MD

## 2024-05-23 ENCOUNTER — LAB (OUTPATIENT)
Dept: LAB | Facility: CLINIC | Age: 64
End: 2024-05-23
Payer: COMMERCIAL

## 2024-05-23 DIAGNOSIS — Z12.5 SCREENING FOR PROSTATE CANCER: ICD-10-CM

## 2024-05-23 DIAGNOSIS — E78.5 HYPERLIPIDEMIA LDL GOAL <100: ICD-10-CM

## 2024-05-23 DIAGNOSIS — M1A.9XX0 CHRONIC GOUT INVOLVING TOE OF LEFT FOOT WITHOUT TOPHUS, UNSPECIFIED CAUSE: ICD-10-CM

## 2024-05-23 LAB
ALBUMIN SERPL BCG-MCNC: 4.5 G/DL (ref 3.5–5.2)
ALP SERPL-CCNC: 86 U/L (ref 40–150)
ALT SERPL W P-5'-P-CCNC: 31 U/L (ref 0–70)
ANION GAP SERPL CALCULATED.3IONS-SCNC: 12 MMOL/L (ref 7–15)
AST SERPL W P-5'-P-CCNC: 20 U/L (ref 0–45)
BILIRUB SERPL-MCNC: 0.4 MG/DL
BUN SERPL-MCNC: 20 MG/DL (ref 8–23)
CALCIUM SERPL-MCNC: 9.4 MG/DL (ref 8.8–10.2)
CHLORIDE SERPL-SCNC: 106 MMOL/L (ref 98–107)
CHOLEST SERPL-MCNC: 251 MG/DL
CREAT SERPL-MCNC: 0.75 MG/DL (ref 0.67–1.17)
DEPRECATED HCO3 PLAS-SCNC: 23 MMOL/L (ref 22–29)
EGFRCR SERPLBLD CKD-EPI 2021: >90 ML/MIN/1.73M2
ERYTHROCYTE [DISTWIDTH] IN BLOOD BY AUTOMATED COUNT: 13.9 % (ref 10–15)
FASTING STATUS PATIENT QL REPORTED: YES
FASTING STATUS PATIENT QL REPORTED: YES
GLUCOSE SERPL-MCNC: 119 MG/DL (ref 70–99)
HCT VFR BLD AUTO: 44.9 % (ref 40–53)
HDLC SERPL-MCNC: 44 MG/DL
HGB BLD-MCNC: 15.2 G/DL (ref 13.3–17.7)
LDLC SERPL CALC-MCNC: 159 MG/DL
MCH RBC QN AUTO: 31 PG (ref 26.5–33)
MCHC RBC AUTO-ENTMCNC: 33.9 G/DL (ref 31.5–36.5)
MCV RBC AUTO: 92 FL (ref 78–100)
NONHDLC SERPL-MCNC: 207 MG/DL
PLATELET # BLD AUTO: 201 10E3/UL (ref 150–450)
POTASSIUM SERPL-SCNC: 4.8 MMOL/L (ref 3.4–5.3)
PROT SERPL-MCNC: 7.2 G/DL (ref 6.4–8.3)
PSA SERPL DL<=0.01 NG/ML-MCNC: 2.28 NG/ML (ref 0–4.5)
RBC # BLD AUTO: 4.9 10E6/UL (ref 4.4–5.9)
SODIUM SERPL-SCNC: 141 MMOL/L (ref 135–145)
TRIGL SERPL-MCNC: 240 MG/DL
URATE SERPL-MCNC: 4.7 MG/DL (ref 3.4–7)
WBC # BLD AUTO: 5.5 10E3/UL (ref 4–11)

## 2024-05-23 PROCEDURE — G0103 PSA SCREENING: HCPCS

## 2024-05-23 PROCEDURE — 80061 LIPID PANEL: CPT

## 2024-05-23 PROCEDURE — 84550 ASSAY OF BLOOD/URIC ACID: CPT

## 2024-05-23 PROCEDURE — 80053 COMPREHEN METABOLIC PANEL: CPT

## 2024-05-23 PROCEDURE — 85027 COMPLETE CBC AUTOMATED: CPT

## 2024-05-23 PROCEDURE — 36415 COLL VENOUS BLD VENIPUNCTURE: CPT

## 2024-05-29 ENCOUNTER — OFFICE VISIT (OUTPATIENT)
Dept: RHEUMATOLOGY | Facility: CLINIC | Age: 64
End: 2024-05-29
Payer: COMMERCIAL

## 2024-05-29 VITALS
SYSTOLIC BLOOD PRESSURE: 132 MMHG | OXYGEN SATURATION: 95 % | WEIGHT: 229.7 LBS | BODY MASS INDEX: 34.93 KG/M2 | HEART RATE: 76 BPM | DIASTOLIC BLOOD PRESSURE: 80 MMHG

## 2024-05-29 DIAGNOSIS — M1A.00X0 CHRONIC GOUTY ARTHROPATHY: Primary | ICD-10-CM

## 2024-05-29 DIAGNOSIS — M15.0 PRIMARY OSTEOARTHRITIS INVOLVING MULTIPLE JOINTS: ICD-10-CM

## 2024-05-29 PROCEDURE — G2211 COMPLEX E/M VISIT ADD ON: HCPCS | Performed by: INTERNAL MEDICINE

## 2024-05-29 PROCEDURE — 99214 OFFICE O/P EST MOD 30 MIN: CPT | Performed by: INTERNAL MEDICINE

## 2024-05-29 RX ORDER — ALLOPURINOL 300 MG/1
2 TABLET ORAL DAILY
Qty: 180 TABLET | Refills: 1 | Status: SHIPPED | OUTPATIENT
Start: 2024-05-29

## 2024-05-29 NOTE — PROGRESS NOTES
Rheumatology follow-up visit note     Low is a 63 year old male presents today for follow-up.    Sanket was seen today for recheck.    Diagnoses and all orders for this visit:    Chronic gouty arthropathy  -     allopurinol (ZYLOPRIM) 300 MG tablet; Take 2 tablets (600 mg) by mouth daily    Primary osteoarthritis involving multiple joints        The longitudinal plan of care for the diagnosis(es)/condition(s) as documented were addressed during this visit. Due to the added complexity in care, I will continue to support Sanket in the subsequent management and with ongoing continuity of care.      Follow up in 1 year.    HPI    Low Waller is a 63 year old male is here for follow-up of  gout, crystal proven.  Over the past year he has done well with regards to gout without flareups, taking allopurinol 600 mg daily serum urate 4.7.  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  He is enjoying his penitentiary.  He did to call his muscle as he describes his pain pickleball.  He is still lifting weights under care of a  3 times a week and feels much better for that. that he does miss work. Some days he noted  His wife is a nurse is done well with the breast cancer and has given up hospital job to work at [employer].  He has osteoarthritis.  Occasional knee pain.  Bilateral hip replacement.  He has retired from his job at [email or web address].           DETAILED EXAMINATION  05/29/24  :    Vitals:    05/29/24 0759   BP: 132/80   Pulse: 76   SpO2: 95%   Weight: 104.2 kg (229 lb 11.2 oz)     Alert oriented. Head including the face is examined for malar rash, heliotropes, scarring, lupus pernio. Eyes examined for redness such as in episcleritis/scleritis, periorbital lesions.   Neck/ Face examined for parotid gland swelling, range of motion of neck.  Left upper and lower and right upper and lower extremities examined for tenderness, swelling, warmth of the appendicular joints, range of  motion, edema, rash.  Some of the important findings included: he does not have evidence of synovitis in the palpable joints of the upper extremities.  No significant deformities of the digits.  no Heberden nodes.  Range of motion of the shoulders  show full abduction.  No JLT effusion or warmth of the knees.  he does not have dactylitis of the digits.     Patient Active Problem List    Diagnosis Date Noted    Bipolar I disorder, single manic episode (H) 02/27/2024     Priority: Medium    Left inguinal hernia 11/07/2022     Priority: Medium     Added automatically from request for surgery [id number]      Umbilical hernia without obstruction and without gangrene 11/07/2022     Priority: Medium     Added automatically from request for surgery [id number]      Elevated blood pressure reading without diagnosis of hypertension 11/11/2021     Priority: Medium    Class 2 obesity due to excess calories without serious comorbidity with body mass index (BMI) of 36.0 to 36.9 in adult 11/11/2021     Priority: Medium    Ocular rosacea 11/11/2021     Priority: Medium    Idiopathic chronic gout of foot without tophus, unspecified laterality 10/31/2012     Priority: Medium    Status post revision of total hip 10/31/2012     Priority: Medium    Primary osteoarthritis of right hip 10/24/2012     Priority: Medium     Past Surgical History:   Procedure Laterality Date    ARTHROPLASTY HIP  10/31/2012    Procedure: ARTHROPLASTY HIP;  RIGHT TOTAL HIP ARTHROPLASTY (SMITH & NEPHEW OXINIUM);  Surgeon: Adama Flores MD;  Location:  OR    ARTHROPLASTY REVISION HIP  10/31/2012    Procedure: ARTHROPLASTY REVISION HIP;  REVISION RIGHT HIP - ACETABULAR COMPONENT and femoral head- MARY & NEPHEW;  Surgeon: Adama Flores MD;  Location:  OR    CATARACT EXTRACTION Bilateral     DERMABRASION FACE      ENT SURGERY      tonsillectomy    EYE SURGERY      florin cataracts    HERNIORRHAPHY UMBILICAL N/A 12/2/2022    Procedure:  HERNIORRHAPHY, UMBILICAL, OPEN;  Surgeon: Dileep Palomo MD;  Location: [facility] OR    JOINT REPLACEMENT Bilateral     hip    LAPAROSCOPIC HERNIORRHAPHY INGUINAL Bilateral 12/2/2022    Procedure: HERNIORRHAPHY, INGUINAL, LAPAROSCOPIC - BILATERAL; HERNIORRHAPHY, UMBILICAL, OPEN; HERNIORRHAPHY, VENTRAL, OPEN;  Surgeon: Dileep Palomo MD;  Location: [facility] OR    ORTHOPEDIC SURGERY      (L) hip resurfacing    TONSILLECTOMY        Past Medical History:   Diagnosis Date    Bipolar affective (H)     Chronic gout     Depression     Hyperlipidemia     Motion sickness     Obese     Rosacea     Syncope     march 2012 work up done, no reason discovered     Allergies   Allergen Reactions    Meperidine Rash     Other reaction(s): Hives    Bee Venom      Current Outpatient Medications   Medication Sig Dispense Refill    allopurinol (ZYLOPRIM) 300 MG tablet Take 2 tablets (600 mg) by mouth daily 180 tablet 1    cycloSPORINE (RESTASIS) 0.05 % ophthalmic emulsion Place 1 drop into both eyes      doxycycline hyclate (VIBRA-TABS) 100 MG tablet Take 1 tablet by mouth daily      erythromycin (ROMYCIN) 5 MG/GM ophthalmic ointment APPLY 0.25 INCH RIBBON TO BOTH EYES EVERY EVENING      guaiFENesin-codeine (ROBITUSSIN AC) 100-10 MG/5ML solution Take 5-10 mLs by mouth every 4 hours as needed for cough 237 mL 0     family history includes Cerebrovascular Disease in his father and mother; Dementia in his mother.  Social Connections: Unknown (2/13/2024)    Social Connection and Isolation Panel [NHANES]     Frequency of Communication with Friends and Family: Not on file     Frequency of Social Gatherings with Friends and Family: Twice a week     Attends Latter day Services: Not on file     Active Member of Clubs or Organizations: Not on file     Attends Club or Organization Meetings: Not on file     Marital Status: Not on file          WBC Count   Date Value Ref Range Status   05/23/2024 5.5 4.0 - 11.0 10e3/uL Final     RBC  Count   Date Value Ref Range Status   05/23/2024 4.90 4.40 - 5.90 10e6/uL Final     Hemoglobin   Date Value Ref Range Status   05/23/2024 15.2 13.3 - 17.7 g/dL Final   11/02/2012 9.1 (L) 13.3 - 17.7 g/dL Final     Hematocrit   Date Value Ref Range Status   05/23/2024 44.9 40.0 - 53.0 % Final     MCV   Date Value Ref Range Status   05/23/2024 92 78 - 100 fL Final     MCH   Date Value Ref Range Status   05/23/2024 31.0 26.5 - 33.0 pg Final     Platelet Count   Date Value Ref Range Status   05/23/2024 201 150 - 450 10e3/uL Final     AST   Date Value Ref Range Status   05/23/2024 20 0 - 45 U/L Final     Comment:     Reference intervals for this test were updated on 6/12/2023 to more accurately reflect our healthy population. There may be differences in the flagging of prior results with similar values performed with this method. Interpretation of those prior results can be made in the context of the updated reference intervals.     ALT   Date Value Ref Range Status   05/23/2024 31 0 - 70 U/L Final     Comment:     Reference intervals for this test were updated on 6/12/2023 to more accurately reflect our healthy population. There may be differences in the flagging of prior results with similar values performed with this method. Interpretation of those prior results can be made in the context of the updated reference intervals.       Albumin   Date Value Ref Range Status   05/23/2024 4.5 3.5 - 5.2 g/dL Final   03/11/2022 4.4 3.5 - 5.0 g/dL Final     Alkaline Phosphatase   Date Value Ref Range Status   05/23/2024 86 40 - 150 U/L Final     Creatinine   Date Value Ref Range Status   05/23/2024 0.75 0.67 - 1.17 mg/dL Final   02/26/2010 0.83 0.66 - 1.25 mg/dL Final     Comment:     New IDMS-traceable calibration  beginning 5/1/08     GFR Estimate   Date Value Ref Range Status   05/23/2024 >90 >60 mL/min/1.73m2 Final   03/08/2021 >60 >60 mL/min/1.73m2 Final   02/26/2010 >90 >60 mL/min/1.7m2 Final     GFR Estimate If Black    Date Value Ref Range Status   03/08/2021 >60 >60 mL/min/1.73m2 Final   02/26/2010 >90 >60 mL/min/1.7m2 Final

## 2024-06-10 ENCOUNTER — ALLIED HEALTH/NURSE VISIT (OUTPATIENT)
Dept: FAMILY MEDICINE | Facility: CLINIC | Age: 64
End: 2024-06-10
Payer: COMMERCIAL

## 2024-06-10 DIAGNOSIS — Z23 ENCOUNTER FOR IMMUNIZATION: Primary | ICD-10-CM

## 2024-06-10 PROCEDURE — 90750 HZV VACC RECOMBINANT IM: CPT

## 2024-06-10 PROCEDURE — 90471 IMMUNIZATION ADMIN: CPT

## 2024-06-10 NOTE — PROGRESS NOTES
Prior to immunization administration, verified patients identity using patient s name and date of birth. Please see Immunization Activity for additional information.     Screening Questionnaire for Adult Immunization    Are you sick today?   No   Do you have allergies to medications, food, a vaccine component or latex?   No   Have you ever had a serious reaction after receiving a vaccination?   No   Do you have a long-term health problem with heart, lung, kidney, or metabolic disease (e.g., diabetes), asthma, a blood disorder, no spleen, complement component deficiency, a cochlear implant, or a spinal fluid leak?  Are you on long-term aspirin therapy?   No   Do you have cancer, leukemia, HIV/AIDS, or any other immune system problem?   No   Do you have a parent, brother, or sister with an immune system problem?   No   In the past 3 months, have you taken medications that affect  your immune system, such as prednisone, other steroids, or anticancer drugs; drugs for the treatment of rheumatoid arthritis, Crohn s disease, or psoriasis; or have you had radiation treatments?   No   Have you had a seizure, or a brain or other nervous system problem?   No   During the past year, have you received a transfusion of blood or blood    products, or been given immune (gamma) globulin or antiviral drug?   No   For women: Are you pregnant or is there a chance you could become       pregnant during the next month?   No   Have you received any vaccinations in the past 4 weeks?   No     Immunization questionnaire answers were all negative.    I have reviewed the following standing orders:   This patient is due and qualifies for the Zoster vaccine.    Click here for Zoster Standing Order    I have reviewed the vaccines inclusion and exclusion criteria; No concerns regarding eligibility.     Patient instructed to remain in clinic for 15 minutes afterwards, and to report any adverse reactions.     Screening performed by Margarita Gibson  MA on 6/10/2024 at 8:22 AM.

## 2024-10-09 ENCOUNTER — PATIENT OUTREACH (OUTPATIENT)
Dept: GASTROENTEROLOGY | Facility: CLINIC | Age: 64
End: 2024-10-09
Payer: COMMERCIAL

## 2024-10-09 DIAGNOSIS — Z12.11 SPECIAL SCREENING FOR MALIGNANT NEOPLASMS, COLON: Primary | ICD-10-CM

## 2024-10-09 NOTE — PROGRESS NOTES
"[facility] Screening Colonoscopy Referral Review    Patient meets the inclusion criteria for screening colonoscopy standing order.    Ordering/Referring Provider:  Josh Noble      BMI: Estimated body mass index is 34.93 kg/m  as calculated from the following:    Height as of 2/15/24: 1.727 m (5' 8\").    Weight as of 5/29/24: 104.2 kg (229 lb 11.2 oz).     Sedation:  Does patient have any of the following conditions affecting sedation?  No medical conditions affecting sedation.    Previous Scopes:  Any previous recommendations or follow up needs based on previous scope?  na / No recommendations.    Medical Concerns to Postpone Order:  Does patient have any of the following medical concerns that should postpone/delay colonoscopy referral?  No medical conditions affecting colonoscopy referral.    Final Referral Details:  Based on patient's medical history patient is appropriate for referral order with moderate sedation. If patient's BMI > 50 do not schedule in [facility].  "

## 2024-12-05 ENCOUNTER — LAB (OUTPATIENT)
Dept: LAB | Facility: CLINIC | Age: 64
End: 2024-12-05
Payer: COMMERCIAL

## 2024-12-05 DIAGNOSIS — M1A.9XX0 CHRONIC GOUT INVOLVING TOE OF LEFT FOOT WITHOUT TOPHUS, UNSPECIFIED CAUSE: ICD-10-CM

## 2024-12-05 LAB
ALBUMIN SERPL BCG-MCNC: 4.4 G/DL (ref 3.5–5.2)
ALT SERPL W P-5'-P-CCNC: 44 U/L (ref 0–70)
CREAT SERPL-MCNC: 0.73 MG/DL (ref 0.67–1.17)
EGFRCR SERPLBLD CKD-EPI 2021: >90 ML/MIN/1.73M2
ERYTHROCYTE [DISTWIDTH] IN BLOOD BY AUTOMATED COUNT: 14 % (ref 10–15)
HCT VFR BLD AUTO: 42.7 % (ref 40–53)
HGB BLD-MCNC: 14.5 G/DL (ref 13.3–17.7)
MCH RBC QN AUTO: 30.7 PG (ref 26.5–33)
MCHC RBC AUTO-ENTMCNC: 34 G/DL (ref 31.5–36.5)
MCV RBC AUTO: 91 FL (ref 78–100)
PLATELET # BLD AUTO: 211 10E3/UL (ref 150–450)
RBC # BLD AUTO: 4.72 10E6/UL (ref 4.4–5.9)
URATE SERPL-MCNC: 4.5 MG/DL (ref 3.4–7)
WBC # BLD AUTO: 6.3 10E3/UL (ref 4–11)

## 2025-02-14 PROBLEM — D12.6 COLON ADENOMAS: Status: ACTIVE | Noted: 2025-02-14

## 2025-03-17 ENCOUNTER — TRANSFERRED RECORDS (OUTPATIENT)
Dept: HEALTH INFORMATION MANAGEMENT | Facility: CLINIC | Age: 65
End: 2025-03-17
Payer: COMMERCIAL

## 2025-04-30 SDOH — HEALTH STABILITY: PHYSICAL HEALTH: ON AVERAGE, HOW MANY DAYS PER WEEK DO YOU ENGAGE IN MODERATE TO STRENUOUS EXERCISE (LIKE A BRISK WALK)?: 0 DAYS

## 2025-04-30 SDOH — HEALTH STABILITY: PHYSICAL HEALTH: ON AVERAGE, HOW MANY MINUTES DO YOU ENGAGE IN EXERCISE AT THIS LEVEL?: 0 MIN

## 2025-04-30 ASSESSMENT — PATIENT HEALTH QUESTIONNAIRE - PHQ9
SUM OF ALL RESPONSES TO PHQ QUESTIONS 1-9: 18
SUM OF ALL RESPONSES TO PHQ QUESTIONS 1-9: 18
10. IF YOU CHECKED OFF ANY PROBLEMS, HOW DIFFICULT HAVE THESE PROBLEMS MADE IT FOR YOU TO DO YOUR WORK, TAKE CARE OF THINGS AT HOME, OR GET ALONG WITH OTHER PEOPLE: VERY DIFFICULT

## 2025-04-30 ASSESSMENT — SOCIAL DETERMINANTS OF HEALTH (SDOH): HOW OFTEN DO YOU GET TOGETHER WITH FRIENDS OR RELATIVES?: TWICE A WEEK

## 2025-05-01 ENCOUNTER — ORDERS ONLY (AUTO-RELEASED) (OUTPATIENT)
Dept: FAMILY MEDICINE | Facility: CLINIC | Age: 65
End: 2025-05-01

## 2025-05-01 ENCOUNTER — OFFICE VISIT (OUTPATIENT)
Dept: FAMILY MEDICINE | Facility: CLINIC | Age: 65
End: 2025-05-01

## 2025-05-01 VITALS
OXYGEN SATURATION: 95 % | HEART RATE: 87 BPM | WEIGHT: 233.4 LBS | RESPIRATION RATE: 15 BRPM | SYSTOLIC BLOOD PRESSURE: 151 MMHG | BODY MASS INDEX: 33.41 KG/M2 | HEIGHT: 70 IN | TEMPERATURE: 97.8 F | DIASTOLIC BLOOD PRESSURE: 82 MMHG

## 2025-05-01 DIAGNOSIS — E66.811 CLASS 1 OBESITY WITH SERIOUS COMORBIDITY AND BODY MASS INDEX (BMI) OF 32.0 TO 32.9 IN ADULT, UNSPECIFIED OBESITY TYPE: ICD-10-CM

## 2025-05-01 DIAGNOSIS — R00.2 PALPITATIONS: ICD-10-CM

## 2025-05-01 DIAGNOSIS — D12.6 SERRATED ADENOMA OF COLON: ICD-10-CM

## 2025-05-01 DIAGNOSIS — I10 ESSENTIAL HYPERTENSION: ICD-10-CM

## 2025-05-01 DIAGNOSIS — L71.8 OCULAR ROSACEA: ICD-10-CM

## 2025-05-01 DIAGNOSIS — Z12.5 SCREENING FOR PROSTATE CANCER: ICD-10-CM

## 2025-05-01 DIAGNOSIS — Z23 ENCOUNTER FOR IMMUNIZATION: ICD-10-CM

## 2025-05-01 DIAGNOSIS — Z00.00 WELCOME TO MEDICARE PREVENTIVE VISIT: Primary | ICD-10-CM

## 2025-05-01 DIAGNOSIS — F30.9 BIPOLAR I DISORDER, SINGLE MANIC EPISODE (H): ICD-10-CM

## 2025-05-01 DIAGNOSIS — M1A.9XX0 CHRONIC GOUT INVOLVING TOE OF LEFT FOOT WITHOUT TOPHUS, UNSPECIFIED CAUSE: ICD-10-CM

## 2025-05-01 DIAGNOSIS — R06.83 SNORING: ICD-10-CM

## 2025-05-01 DIAGNOSIS — E78.5 HYPERLIPIDEMIA LDL GOAL <100: ICD-10-CM

## 2025-05-01 LAB
ATRIAL RATE - MUSE: 74 BPM
DIASTOLIC BLOOD PRESSURE - MUSE: NORMAL MMHG
ERYTHROCYTE [DISTWIDTH] IN BLOOD BY AUTOMATED COUNT: 13.5 % (ref 10–15)
HCT VFR BLD AUTO: 45.7 % (ref 40–53)
HGB BLD-MCNC: 15.7 G/DL (ref 13.3–17.7)
INTERPRETATION ECG - MUSE: NORMAL
MCH RBC QN AUTO: 30.3 PG (ref 26.5–33)
MCHC RBC AUTO-ENTMCNC: 34.4 G/DL (ref 31.5–36.5)
MCV RBC AUTO: 88 FL (ref 78–100)
P AXIS - MUSE: 45 DEGREES
PLATELET # BLD AUTO: 220 10E3/UL (ref 150–450)
PR INTERVAL - MUSE: 174 MS
QRS DURATION - MUSE: 80 MS
QT - MUSE: 388 MS
QTC - MUSE: 430 MS
R AXIS - MUSE: 10 DEGREES
RBC # BLD AUTO: 5.18 10E6/UL (ref 4.4–5.9)
SYSTOLIC BLOOD PRESSURE - MUSE: NORMAL MMHG
T AXIS - MUSE: 36 DEGREES
VENTRICULAR RATE- MUSE: 74 BPM
WBC # BLD AUTO: 7.6 10E3/UL (ref 4–11)

## 2025-05-01 RX ORDER — LOSARTAN POTASSIUM 50 MG/1
50 TABLET ORAL DAILY
Qty: 90 TABLET | Refills: 3 | Status: SHIPPED | OUTPATIENT
Start: 2025-05-01

## 2025-05-01 ASSESSMENT — PAIN SCALES - GENERAL: PAINLEVEL_OUTOF10: MILD PAIN (3)

## 2025-05-01 NOTE — PROGRESS NOTES
Preventive Care Visit  [facility]  Josh Noble MD, Family Medicine  May 1, 2025      Assessment & Plan     Welcome to Medicare preventive visit  Annual Wellness Visit completed.  Risk questionaire reviewed in detail.  Appropriate preventive services were discussed with this patient, including applicable screening as appropriate for fall prevention, nutrition, physical activity, tobacco-use cessation, weight loss, and cognition.  Annual Wellness Visits recommended to continue.     Class 1 obesity with serious comorbidity and body mass index (BMI) of 32.0 to 32.9 in adult, unspecified obesity type  Dietary and exercise modifications reviewed for weight goal less than 220 pounds initially, less than 200 pounds ideally.    Palpitations  Palpitations reviewed.  EKG to be completed.  Zio patch x 2 weeks.  Electrolytes etc. completed.  EKG did show normal sinus rhythm with normal EKG appearance with ventricular rate 74 bpm.  - ZIO PATCH MAIL OUT  - EKG 12-lead, tracing only  - Magnesium  - TSH with free T4 reflex  - Comprehensive metabolic panel  - CBC with platelets    Essential hypertension  Hypertension new diagnosis blood pressure 152/80 on recheck and will initiate losartan 50 mg daily with blood pressure recheck anticipated in the next 3 months.  - Comprehensive metabolic panel  - losartan (COZAAR) 50 MG tablet  Dispense: 90 tablet; Refill: 3    Hyperlipidemia LDL goal <100  Hyperlipidemia.  Lipid cascade updated today while fasting.  Weight goal less than 220 pounds initially, less than 200 pounds ideally.  Patient declines statin therapy.  - Lipid panel reflex to direct LDL Fasting    The 10-year ASCVD risk score (Gifty DK, et al., 2019) is: 20.7%    Values used to calculate the score:      Age: 64 years      Sex: Male      Is Non- : No      Diabetic: No      Tobacco smoker: No      Systolic Blood Pressure: 151 mmHg      Is BP treated: No      HDL Cholesterol: 44  "mg/dL      Total Cholesterol: 251 mg/dL     Ocular rosacea  Ocular rosacea.  Uses cyclosporine drops as well as doxycycline 100 mg daily.    Chronic gout involving toe of left foot without tophus, unspecified cause  History of gout.  Allopurinol 600 mg daily.  Check uric acid and CBC.  - Uric acid    Bipolar I disorder, single manic episode (H)  Patient feels like he is doing well currently without medication.  Has been on lithium in the past.  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  PHQ-9 questionnaire 18 out of 27.    Serrated adenoma of colon  Tubular adenoma x 2 on colonoscopy February 7, 2025 for 5-year follow-up recommendation.    Screening for prostate cancer  PSA for prostate cancer screening based on age criteria.    Encounter for immunization  Updated COVID booster pneumococcal 20 vaccination to be provided.  - COVID-19 12+ (PFIZER)  - Pneumococcal 20 Valent Conjugate (PCV20)    Snoring  Snoring historically.  Sleep study to be completed with underlying palpitations, hypertension etc. reviewed.  - Adult Sleep Eval & Management  Referral      Patient has been advised of split billing requirements and indicates understanding: Yes        BMI  Estimated body mass index is 33.79 kg/m  as calculated from the following:    Height as of this encounter: 1.77 m (5' 9.69\").    Weight as of this encounter: 105.9 kg (233 lb 6.4 oz).   Weight management plan: Discussed healthy diet and exercise guidelines    Depression Screening Follow Up        4/30/2025     1:31 PM   PHQ   PHQ-9 Total Score 18    Q9: Thoughts of better off dead/self-harm past 2 weeks Not at all       Patient-reported         4/30/2025     1:31 PM   Last PHQ-9   1.  Little interest or pleasure in doing things 3   2.  Feeling down, depressed, or hopeless 2   3.  Trouble falling or staying asleep, or sleeping too much 3   4.  Feeling tired or having little energy 3   5.  Poor appetite or overeating 3   6.  Feeling " bad about yourself 2   7.  Trouble concentrating 2   8.  Moving slowly or restless 0   Q9: Thoughts of better off dead/self-harm past 2 weeks 0   PHQ-9 Total Score 18        Patient-reported         Follow Up Actions Taken  Crisis resource information provided in After Visit Summary     Counseling  Appropriate preventive services were addressed with this patient via screening, questionnaire, or discussion as appropriate for fall prevention, nutrition, physical activity, Tobacco-use cessation, social engagement, weight loss and cognition.  Checklist reviewing preventive services available has been given to the patient.  Reviewed patient's diet, addressing concerns and/or questions.   The patient's PHQ-9 score is consistent with moderate depression. He was provided with information regarding depression.           Subjective   Will is a 64 year old, presenting for the following:  Physical, Hypertension, Irregular Heart Beat (Tachy), and Musculoskeletal Problem        5/1/2025    11:18 AM   Additional Questions   Roomed by Jacqueline          Musculoskeletal Problem      Patient seen today for physical exam.  64 years old.  Patient with underlying obesity with BMI 33.79.  Recent palpitations and feels like he may have been in atrial fibrillation.  Based on someone that he knows having similar symptoms of skipping beats at times at [employer].  Also recommends that blood pressure tends to be elevated outside of office.  Hyperlipidemia but wants to avoid statin.  Ocular rosacea treated with cyclosporine drops and doxycycline.  Chronic gout.  No recent exacerbation.  Has been on lithium for bipolar disorder in the past but remains off medication.'s tubular adenoma x 2 on colonoscopy February 7, 2025 and told to repeat at 5-year interval.  Did have prior left inguinal hernia repair and umbilical hernia repair etc.  Continuing to do fine.  No significant nocturia.  Comprehensive review of systems as above otherwise all  "negative.      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 Flores); 10/31/12 right BRUNA (Dr. Flores); 12/2/22 scheduled left inguinal hernia repair and umbilical hernia repair (Dr. Palomo)   Volunteer: \"[employer]\" (read with a 10 y.o. for 1 hour every Sunday, but she is painfully shy...)   Raised Qu[employer] but left Protestant (believe in God)   PHQ-9 = 2/27 (10/17/12)           Advance Care Planning    Discussed advance care planning with patient; informed AVS has link to [employer].        4/30/2025   General Health   How would you rate your overall physical health? Good   Feel stress (tense, anxious, or unable to sleep) Only a little   (!) STRESS CONCERN      4/30/2025   Nutrition   Diet: Regular (no restrictions)         4/30/2025   Exercise   Days per week of moderate/strenous exercise 0 days   Average minutes spent exercising at this level 0 min   (!) EXERCISE CONCERN      4/30/2025   Social Factors   Frequency of gathering with friends or relatives Twice a week   Worry food won't last until get money to buy more No   Food not last or not have enough money for food? No   Do you have housing? (Housing is defined as stable permanent housing and does not include staying outside in a car, in a tent, in an abandoned building, in an overnight shelter, or couch-surfing.) Yes   Are you worried about losing your housing? No   Lack of transportation? No   Unable to " get utilities (heat,electricity)? No         2025   Fall Risk   Fallen 2 or more times in the past year? No   Trouble with walking or balance? No          2025   Activities of Daily Living- Home Safety   Needs help with the following daily activites None of the above   Safety concerns in the home None of the above         2025   Dental   Dentist two times every year? Yes         2025   Hearing Screening   Hearing concerns? None of the above         2025   Driving Risk Screening   Patient/family members have concerns about driving No         2025   General Alertness/Fatigue Screening   Have you been more tired than usual lately? No         2025   Urinary Incontinence Screening   Bothered by leaking urine in past 6 months No       Today's PHQ-9 Score:       2025     1:31 PM   PHQ-9 SCORE   PHQ-9 Total Score MyChart 18 (Moderately severe depression)   PHQ-9 Total Score 18        Patient-reported         2025   Substance Use   Alcohol more than 3/day or more than 7/wk No   Do you have a current opioid prescription? No   How severe/bad is pain from 1 to 10? 0/10 (No Pain)   Do you use any other substances recreationally? No     Social History     Tobacco Use    Smoking status: Former     Current packs/day: 0.00     Average packs/day: 0.5 packs/day for 18.0 years (9.0 ttl pk-yrs)     Types: Cigarettes     Start date: 1977     Quit date: 1995     Years since quittin.3    Smokeless tobacco: Never    Tobacco comments:     quit in    Substance Use Topics    Alcohol use: Yes     Alcohol/week: 1.7 standard drinks of alcohol     Comment: Alcoholic Drinks/day: occasionally     Drug use: No           2025   AAA Screening   Family history of Abdominal Aortic Aneurysm (AAA)? No   Last PSA:   Prostate Specific Antigen Screen   Date Value Ref Range Status   2024 2.28 0.00 - 4.50 ng/mL Final   2022 1.74 0.00 - 4.50 ug/L Final     ASCVD Risk   The  10-year ASCVD risk score (Gifty CONTRERAS, et al., 2019) is: 20.7%    Values used to calculate the score:      Age: 64 years      Sex: Male      Is Non- : No      Diabetic: No      Tobacco smoker: No      Systolic Blood Pressure: 151 mmHg      Is BP treated: No      HDL Cholesterol: 44 mg/dL      Total Cholesterol: 251 mg/dL    Fracture Risk Assessment Tool  Link to Frax Calculator  Use the information below to complete the Frax calculator  : 1960  Sex: male  Weight (kg): 105.9 kg (actual weight)  Height (cm): 177 cm  Previous Fragility Fracture:  No  History of parent with fractured hip:  No  Current Smoking:  No  Patient has been on glucocorticoids for more than 3 months (5mg/day or more): No  Rheumatoid Arthritis on Problem List:  No  Secondary Osteoporosis on Problem List:  No  Consumes 3 or more units of alcohol per day: No  Femoral Neck BMD (g/cm2)            Reviewed and updated as needed this visit by Provider                    Past Medical History:   Diagnosis Date    Bipolar affective (H)     Chronic gout     Depression     Hyperlipidemia     Motion sickness     Obese     Rosacea     Syncope     2012 work up done, no reason discovered     Past Surgical History:   Procedure Laterality Date    ARTHROPLASTY HIP  10/31/2012    Procedure: ARTHROPLASTY HIP;  RIGHT TOTAL HIP ARTHROPLASTY (SMITH & NEPHEW OXINIUM);  Surgeon: Adama Flores MD;  Location:  OR    ARTHROPLASTY REVISION HIP  10/31/2012    Procedure: ARTHROPLASTY REVISION HIP;  REVISION RIGHT HIP - ACETABULAR COMPONENT and femoral head- MARY & NEPHEW;  Surgeon: Adama Flores MD;  Location:  OR    CATARACT EXTRACTION Bilateral     DERMABRASION FACE      ENT SURGERY      tonsillectomy    EYE SURGERY      florin cataracts    HERNIORRHAPHY UMBILICAL N/A 2022    Procedure: HERNIORRHAPHY, UMBILICAL, OPEN;  Surgeon: Dileep Palomo MD;  Location: [facility] OR    JOINT REPLACEMENT  Bilateral     hip    LAPAROSCOPIC HERNIORRHAPHY INGUINAL Bilateral 12/2/2022    Procedure: HERNIORRHAPHY, INGUINAL, LAPAROSCOPIC - BILATERAL; HERNIORRHAPHY, UMBILICAL, OPEN; HERNIORRHAPHY, VENTRAL, OPEN;  Surgeon: Dileep Palomo MD;  Location: [facility] OR    ORTHOPEDIC SURGERY      (L) hip resurfacing    TONSILLECTOMY       Lab work is in process  Labs reviewed in EPIC  BP Readings from Last 3 Encounters:   05/01/25 (!) 151/82   05/29/24 132/80   02/15/24 132/70    Wt Readings from Last 3 Encounters:   05/01/25 105.9 kg (233 lb 6.4 oz)   05/29/24 104.2 kg (229 lb 11.2 oz)   02/15/24 105.2 kg (232 lb)                  Patient Active Problem List   Diagnosis    Primary osteoarthritis of right hip    Idiopathic chronic gout of foot without tophus, unspecified laterality    Status post revision of total hip    Elevated blood pressure reading without diagnosis of hypertension    Class 2 obesity due to excess calories without serious comorbidity with body mass index (BMI) of 36.0 to 36.9 in adult    Ocular rosacea    Left inguinal hernia    Umbilical hernia without obstruction and without gangrene    Bipolar I disorder, single manic episode (H)    Colon adenomas     Past Surgical History:   Procedure Laterality Date    ARTHROPLASTY HIP  10/31/2012    Procedure: ARTHROPLASTY HIP;  RIGHT TOTAL HIP ARTHROPLASTY (SMITH & NEPHEW OXINIUM);  Surgeon: Adama Flores MD;  Location:  OR    ARTHROPLASTY REVISION HIP  10/31/2012    Procedure: ARTHROPLASTY REVISION HIP;  REVISION RIGHT HIP - ACETABULAR COMPONENT and femoral head- MARY & NEPHEW;  Surgeon: Adama Flores MD;  Location:  OR    CATARACT EXTRACTION Bilateral     DERMABRASION FACE      ENT SURGERY      tonsillectomy    EYE SURGERY      florin cataracts    HERNIORRHAPHY UMBILICAL N/A 12/2/2022    Procedure: HERNIORRHAPHY, UMBILICAL, OPEN;  Surgeon: Dileep Palomo MD;  Location: [facility] OR    JOINT REPLACEMENT Bilateral     hip     LAPAROSCOPIC HERNIORRHAPHY INGUINAL Bilateral 2022    Procedure: HERNIORRHAPHY, INGUINAL, LAPAROSCOPIC - BILATERAL; HERNIORRHAPHY, UMBILICAL, OPEN; HERNIORRHAPHY, VENTRAL, OPEN;  Surgeon: Dileep Palomo MD;  Location: [facility] OR    ORTHOPEDIC SURGERY      (L) hip resurfacing    TONSILLECTOMY         Social History     Tobacco Use    Smoking status: Former     Current packs/day: 0.00     Average packs/day: 0.5 packs/day for 18.0 years (9.0 ttl pk-yrs)     Types: Cigarettes     Start date: 1977     Quit date: 1995     Years since quittin.3    Smokeless tobacco: Never    Tobacco comments:     quit in    Substance Use Topics    Alcohol use: Yes     Alcohol/week: 1.7 standard drinks of alcohol     Comment: Alcoholic Drinks/day: occasionally      Family History   Problem Relation Age of Onset    Dementia Mother     Cerebrovascular Disease Mother     Cerebrovascular Disease Father          Current Outpatient Medications   Medication Sig Dispense Refill    allopurinol (ZYLOPRIM) 300 MG tablet Take 2 tablets (600 mg) by mouth daily. 180 tablet 0    cycloSPORINE (RESTASIS) 0.05 % ophthalmic emulsion Place 1 drop into both eyes      doxycycline hyclate (VIBRA-TABS) 100 MG tablet Take 1 tablet by mouth daily      erythromycin (ROMYCIN) 5 MG/GM ophthalmic ointment APPLY 0.25 INCH RIBBON TO BOTH EYES EVERY EVENING      losartan (COZAAR) 50 MG tablet Take 1 tablet (50 mg) by mouth daily. 90 tablet 3     Allergies   Allergen Reactions    Meperidine Rash     Other reaction(s): Hives    Bee Venom      Current providers sharing in care for this patient include:  Patient Care Team:  Josh Noble MD as PCP - General  Josh Noble MD as Assigned PCP  Yudith Lazo MBBS as Assigned Rheumatology Provider  Josh Noble MD as Referring Physician (Family Medicine)  Emilee Villatoro PA-C as Physician Assistant (Dermatology)    The following health maintenance items are reviewed in Epic and  "correct as of today:  Health Maintenance   Topic Date Due    AORTIC ANEURYSM SCREENING (SYSTEM ASSIGNED)  Never done    BMP  05/23/2025    URIC ACID  12/05/2025    MEDICARE ANNUAL WELLNESS VISIT  05/01/2026    ANNUAL REVIEW OF HM ORDERS  05/01/2026    FALL RISK ASSESSMENT  05/01/2026    DIABETES SCREENING  05/23/2027    DTAP/TDAP/TD IMMUNIZATION (5 - Td or Tdap) 05/31/2027    LIPID  05/23/2029    COLORECTAL CANCER SCREENING  02/07/2030    ADVANCE CARE PLANNING  05/01/2030    HEPATITIS C SCREENING  Completed    HIV SCREENING  Completed    PHQ-2 (once per calendar year)  Completed    INFLUENZA VACCINE  Completed    Pneumococcal Vaccine: 50+ Years  Completed    ZOSTER IMMUNIZATION  Completed    RSV VACCINE  Completed    COVID-19 Vaccine  Completed    HPV IMMUNIZATION  Aged Out    MENINGITIS IMMUNIZATION  Aged Out         Review of Systems  Constitutional, HEENT, cardiovascular, pulmonary, GI, , musculoskeletal, neuro, skin, endocrine and psych systems are negative, except as otherwise noted.     Objective    Exam  BP (!) 151/82 (BP Location: Right arm, Patient Position: Sitting, Cuff Size: Adult Large)   Pulse 87   Temp 97.8  F (36.6  C) (Temporal)   Resp 15   Ht 1.77 m (5' 9.69\")   Wt 105.9 kg (233 lb 6.4 oz)   SpO2 95%   BMI 33.79 kg/m     Estimated body mass index is 33.79 kg/m  as calculated from the following:    Height as of this encounter: 1.77 m (5' 9.69\").    Weight as of this encounter: 105.9 kg (233 lb 6.4 oz).      Physical Exam  GENERAL: alert and no distress  EYES: Eyes grossly normal to inspection, PERRL and hyperemia of sclera and conjunctiva.  HENT: ear canals and TM's normal, nose and mouth without ulcers or lesions  NECK: no adenopathy, no asymmetry, masses, or scars  RESP: lungs clear to auscultation - no rales, rhonchi or wheezes  CV: regular rate and rhythm, normal S1 S2, no S3 or S4, no murmur, click or rub, no peripheral edema  ABDOMEN: soft, nontender, no hepatosplenomegaly, no masses " and bowel sounds normal   (male): deferred per patient  RECTAL: deferred per patient  MS: no gross musculoskeletal defects noted, no edema  SKIN: no suspicious lesions or rashes  NEURO: Normal strength and tone, mentation intact and speech normal  PSYCH: mentation appears normal, affect normal/bright           5/1/2025   Mini Cog   Clock Draw Score 2 Normal   3 Item Recall 3 objects recalled   Mini Cog Total Score 5             Signed Electronically by: Josh Noble MD        Answers submitted by the patient for this visit:  Patient Health Questionnaire (Submitted on 4/30/2025)  If you checked off any problems, how difficult have these problems made it for you to do your work, take care of things at home, or get along with other people?: Very difficult  PHQ9 TOTAL SCORE: 18

## 2025-05-05 ENCOUNTER — PATIENT OUTREACH (OUTPATIENT)
Dept: CARE COORDINATION | Facility: CLINIC | Age: 65
End: 2025-05-05
Payer: COMMERCIAL

## 2025-05-27 ENCOUNTER — LAB (OUTPATIENT)
Dept: LAB | Facility: CLINIC | Age: 65
End: 2025-05-27
Payer: COMMERCIAL

## 2025-05-27 ENCOUNTER — RESULTS FOLLOW-UP (OUTPATIENT)
Dept: RHEUMATOLOGY | Facility: CLINIC | Age: 65
End: 2025-05-27

## 2025-05-27 DIAGNOSIS — M1A.9XX0 CHRONIC GOUT INVOLVING TOE OF LEFT FOOT WITHOUT TOPHUS, UNSPECIFIED CAUSE: ICD-10-CM

## 2025-05-27 LAB
ALBUMIN SERPL BCG-MCNC: 4.4 G/DL (ref 3.5–5.2)
ALT SERPL W P-5'-P-CCNC: 23 U/L (ref 0–70)
CREAT SERPL-MCNC: 0.7 MG/DL (ref 0.67–1.17)
EGFRCR SERPLBLD CKD-EPI 2021: >90 ML/MIN/1.73M2
ERYTHROCYTE [DISTWIDTH] IN BLOOD BY AUTOMATED COUNT: 14.3 % (ref 10–15)
HCT VFR BLD AUTO: 44 % (ref 40–53)
HGB BLD-MCNC: 14.7 G/DL (ref 13.3–17.7)
MCH RBC QN AUTO: 30.4 PG (ref 26.5–33)
MCHC RBC AUTO-ENTMCNC: 33.4 G/DL (ref 31.5–36.5)
MCV RBC AUTO: 91 FL (ref 78–100)
PLATELET # BLD AUTO: 197 10E3/UL (ref 150–450)
RBC # BLD AUTO: 4.84 10E6/UL (ref 4.4–5.9)
URATE SERPL-MCNC: 4.3 MG/DL (ref 3.4–7)
WBC # BLD AUTO: 5.8 10E3/UL (ref 4–11)

## 2025-05-27 PROCEDURE — 85027 COMPLETE CBC AUTOMATED: CPT

## 2025-05-27 PROCEDURE — 82040 ASSAY OF SERUM ALBUMIN: CPT

## 2025-05-27 PROCEDURE — 84460 ALANINE AMINO (ALT) (SGPT): CPT

## 2025-05-27 PROCEDURE — 82565 ASSAY OF CREATININE: CPT

## 2025-05-27 PROCEDURE — 36415 COLL VENOUS BLD VENIPUNCTURE: CPT

## 2025-05-27 PROCEDURE — 84550 ASSAY OF BLOOD/URIC ACID: CPT

## 2025-05-29 ENCOUNTER — OFFICE VISIT (OUTPATIENT)
Dept: RHEUMATOLOGY | Facility: CLINIC | Age: 65
End: 2025-05-29
Payer: COMMERCIAL

## 2025-05-29 VITALS
DIASTOLIC BLOOD PRESSURE: 70 MMHG | OXYGEN SATURATION: 96 % | BODY MASS INDEX: 34.59 KG/M2 | SYSTOLIC BLOOD PRESSURE: 130 MMHG | WEIGHT: 238.9 LBS | HEART RATE: 80 BPM

## 2025-05-29 DIAGNOSIS — M15.0 PRIMARY OSTEOARTHRITIS INVOLVING MULTIPLE JOINTS: ICD-10-CM

## 2025-05-29 DIAGNOSIS — M1A.9XX0 CHRONIC GOUTY ARTHROPATHY: Primary | ICD-10-CM

## 2025-05-29 RX ORDER — ALLOPURINOL 300 MG/1
2 TABLET ORAL DAILY
Qty: 180 TABLET | Refills: 3 | Status: SHIPPED | OUTPATIENT
Start: 2025-05-29 | End: 2025-08-27

## 2025-05-29 ASSESSMENT — PAIN SCALES - GENERAL: PAINLEVEL_OUTOF10: NO PAIN (0)

## 2025-05-29 NOTE — PROGRESS NOTES
Rheumatology follow-up visit note     Low is a 64 year old male presents today for follow-up.    Will was seen today for recheck.    Diagnoses and all orders for this visit:    Chronic gouty arthropathy  -     allopurinol (ZYLOPRIM) 300 MG tablet; Take 2 tablets (600 mg) by mouth daily.    Primary osteoarthritis involving multiple joints        The longitudinal plan of care for the diagnosis(es)/condition(s) as documented were addressed during this visit. Due to the added complexity in care, I will continue to support Sanket in the subsequent management and with ongoing continuity of care.      Follow up in 1 year.    HPI    Low Waller is a 64 year old male is here for follow-up of  gout, crystal proven.  Over the past year he has done well with regards to gout without flareups, taking allopurinol 600 mg daily serum urate 4.3, the prior number was 6.2 when he had become a bit lax on taking allopurinol during the month of April..  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  He is enjoying his correction.  He did to call his muscle as he describes his pain pickleball.  He is still lifting weights under care of a  3 times a week and feels much better for that. that he does miss work. Some days he noted  His wife is a nurse is done well with the breast cancer and has given up hospital job to work at [employer].  Strangely his insurance insists that he only gets 30 days beginning to try a different pharmacy if that works better for him.  He is aware that should he have need for joint pain related to OA, pickleball pain acetaminophen would be the choice.  He has osteoarthritis.  Occasional knee pain.  Bilateral hip replacement.   DETAILED EXAMINATION  05/29/25  :    Vitals:    05/29/25 0814   BP: 130/70   BP Location: Right arm   Patient Position: Sitting   Cuff Size: Adult Large   Pulse: 80   SpO2: 96%   Weight: 108.4 kg (238 lb 14.4 oz)     Alert oriented. Head  including the face is examined for malar rash, heliotropes, scarring, lupus pernio. Eyes examined for redness such as in episcleritis/scleritis, periorbital lesions.   Neck/ Face examined for parotid gland swelling, range of motion of neck.  Left upper and lower and right upper and lower extremities examined for tenderness, swelling, warmth of the appendicular joints, range of motion, edema, rash.  Some of the important findings included: he does not have evidence of synovitis in the palpable joints of the upper extremities.  No significant deformities of the digits.  no Heberden nodes.  Range of motion of the shoulders  show full abduction.  No JLT effusion or warmth of the knees.  he does not have dactylitis of the digits.     Patient Active Problem List    Diagnosis Date Noted    Colon adenomas 02/14/2025     Priority: Medium    Bipolar I disorder, single manic episode (H) 02/27/2024     Priority: Medium    Left inguinal hernia 11/07/2022     Priority: Medium     Added automatically from request for surgery [id number]      Umbilical hernia without obstruction and without gangrene 11/07/2022     Priority: Medium     Added automatically from request for surgery [id number]      Elevated blood pressure reading without diagnosis of hypertension 11/11/2021     Priority: Medium    Class 2 obesity due to excess calories without serious comorbidity with body mass index (BMI) of 36.0 to 36.9 in adult 11/11/2021     Priority: Medium    Ocular rosacea 11/11/2021     Priority: Medium    Idiopathic chronic gout of foot without tophus, unspecified laterality 10/31/2012     Priority: Medium    Status post revision of total hip 10/31/2012     Priority: Medium    Primary osteoarthritis of right hip 10/24/2012     Priority: Medium     Past Surgical History:   Procedure Laterality Date    ARTHROPLASTY HIP  10/31/2012    Procedure: ARTHROPLASTY HIP;  RIGHT TOTAL HIP ARTHROPLASTY (SMITH & NEPHEW OXINIUM);  Surgeon: Adama Flores  MD Davey;  Location: [facility]    ARTHROPLASTY REVISION HIP  10/31/2012    Procedure: ARTHROPLASTY REVISION HIP;  REVISION RIGHT HIP - ACETABULAR COMPONENT and femoral head- MARY & NEPHEW;  Surgeon: Adama Flores MD;  Location: [facility]    CATARACT EXTRACTION Bilateral     DERMABRASION FACE      ENT SURGERY      tonsillectomy    EYE SURGERY      florin cataracts    HERNIORRHAPHY UMBILICAL N/A 12/2/2022    Procedure: HERNIORRHAPHY, UMBILICAL, OPEN;  Surgeon: Dileep Palomo MD;  Location: [facility]    JOINT REPLACEMENT Bilateral     hip    LAPAROSCOPIC HERNIORRHAPHY INGUINAL Bilateral 12/2/2022    Procedure: HERNIORRHAPHY, INGUINAL, LAPAROSCOPIC - BILATERAL; HERNIORRHAPHY, UMBILICAL, OPEN; HERNIORRHAPHY, VENTRAL, OPEN;  Surgeon: Dileep Palomo MD;  Location: [facility]    ORTHOPEDIC SURGERY      (L) hip resurfacing    TONSILLECTOMY        Past Medical History:   Diagnosis Date    Bipolar affective (H)     Chronic gout     Depression     Hyperlipidemia     Motion sickness     Obese     Rosacea     Syncope     march 2012 work up done, no reason discovered     Allergies   Allergen Reactions    Meperidine Rash     Other reaction(s): Hives    Bee Venom      Current Outpatient Medications   Medication Sig Dispense Refill    allopurinol (ZYLOPRIM) 300 MG tablet Take 2 tablets (600 mg) by mouth daily. 180 tablet 0    cycloSPORINE (RESTASIS) 0.05 % ophthalmic emulsion Place 1 drop into both eyes      doxycycline hyclate (VIBRA-TABS) 100 MG tablet Take 1 tablet by mouth daily      erythromycin (ROMYCIN) 5 MG/GM ophthalmic ointment APPLY 0.25 INCH RIBBON TO BOTH EYES EVERY EVENING      losartan (COZAAR) 50 MG tablet Take 1 tablet (50 mg) by mouth daily. 90 tablet 3     family history includes Cerebrovascular Disease in his father and mother; Dementia in his mother.  Social Connections: Unknown (4/30/2025)    Social Connection and Isolation Panel [NHANES]     Frequency of Communication with Friends  and Family: Not on file     Frequency of Social Gatherings with Friends and Family: Twice a week     Attends [employer] Services: Not on file     Active Member of Clubs or Organizations: Not on file     Attends Club or Organization Meetings: Not on file     Marital Status: Not on file          WBC Count   Date Value Ref Range Status   05/27/2025 5.8 4.0 - 11.0 10e3/uL Final     RBC Count   Date Value Ref Range Status   05/27/2025 4.84 4.40 - 5.90 10e6/uL Final     Hemoglobin   Date Value Ref Range Status   05/27/2025 14.7 13.3 - 17.7 g/dL Final   11/02/2012 9.1 (L) 13.3 - 17.7 g/dL Final     Hematocrit   Date Value Ref Range Status   05/27/2025 44.0 40.0 - 53.0 % Final     MCV   Date Value Ref Range Status   05/27/2025 91 78 - 100 fL Final     MCH   Date Value Ref Range Status   05/27/2025 30.4 26.5 - 33.0 pg Final     Platelet Count   Date Value Ref Range Status   05/27/2025 197 150 - 450 10e3/uL Final     AST   Date Value Ref Range Status   05/01/2025 20 0 - 45 U/L Final     ALT   Date Value Ref Range Status   05/27/2025 23 0 - 70 U/L Final     Albumin   Date Value Ref Range Status   05/27/2025 4.4 3.5 - 5.2 g/dL Final   03/11/2022 4.4 3.5 - 5.0 g/dL Final     Alkaline Phosphatase   Date Value Ref Range Status   05/01/2025 100 40 - 150 U/L Final     Creatinine   Date Value Ref Range Status   05/27/2025 0.70 0.67 - 1.17 mg/dL Final   02/26/2010 0.83 0.66 - 1.25 mg/dL Final     Comment:     New IDMS-traceable calibration  beginning 5/1/08     GFR Estimate   Date Value Ref Range Status   05/27/2025 >90 >60 mL/min/1.73m2 Final     Comment:     eGFR calculated using 2021 CKD-EPI equation.   03/08/2021 >60 >60 mL/min/1.73m2 Final   02/26/2010 >90 >60 mL/min/1.7m2 Final     GFR Estimate If Black   Date Value Ref Range Status   03/08/2021 >60 >60 mL/min/1.73m2 Final   02/26/2010 >90 >60 mL/min/1.7m2 Final

## 2025-06-05 ENCOUNTER — RESULTS FOLLOW-UP (OUTPATIENT)
Dept: FAMILY MEDICINE | Facility: CLINIC | Age: 65
End: 2025-06-05

## 2025-06-05 LAB — CV ZIO PRELIM RESULTS: NORMAL

## 2025-06-30 ENCOUNTER — TRANSFERRED RECORDS (OUTPATIENT)
Dept: HEALTH INFORMATION MANAGEMENT | Facility: CLINIC | Age: 65
End: 2025-06-30
Payer: COMMERCIAL

## 2025-08-13 ENCOUNTER — OFFICE VISIT (OUTPATIENT)
Dept: FAMILY MEDICINE | Facility: CLINIC | Age: 65
End: 2025-08-13
Payer: COMMERCIAL

## 2025-08-13 VITALS
TEMPERATURE: 97 F | RESPIRATION RATE: 13 BRPM | HEIGHT: 68 IN | WEIGHT: 236 LBS | BODY MASS INDEX: 35.77 KG/M2 | HEART RATE: 97 BPM | DIASTOLIC BLOOD PRESSURE: 68 MMHG | SYSTOLIC BLOOD PRESSURE: 118 MMHG | OXYGEN SATURATION: 96 %

## 2025-08-13 DIAGNOSIS — E66.811 CLASS 1 OBESITY WITH SERIOUS COMORBIDITY AND BODY MASS INDEX (BMI) OF 32.0 TO 32.9 IN ADULT, UNSPECIFIED OBESITY TYPE: ICD-10-CM

## 2025-08-13 DIAGNOSIS — R00.2 PALPITATIONS: ICD-10-CM

## 2025-08-13 DIAGNOSIS — R73.01 IMPAIRED FASTING GLUCOSE: ICD-10-CM

## 2025-08-13 DIAGNOSIS — E78.5 HYPERLIPIDEMIA LDL GOAL <100: ICD-10-CM

## 2025-08-13 DIAGNOSIS — I10 ESSENTIAL HYPERTENSION: Primary | ICD-10-CM

## 2025-08-13 LAB
EST. AVERAGE GLUCOSE BLD GHB EST-MCNC: 105 MG/DL
HBA1C MFR BLD: 5.3 % (ref 0–5.6)

## 2025-08-13 PROCEDURE — 99214 OFFICE O/P EST MOD 30 MIN: CPT | Performed by: FAMILY MEDICINE

## 2025-08-13 PROCEDURE — 80048 BASIC METABOLIC PNL TOTAL CA: CPT | Performed by: FAMILY MEDICINE

## 2025-08-13 PROCEDURE — G2211 COMPLEX E/M VISIT ADD ON: HCPCS | Performed by: FAMILY MEDICINE

## 2025-08-13 PROCEDURE — 83036 HEMOGLOBIN GLYCOSYLATED A1C: CPT | Performed by: FAMILY MEDICINE

## 2025-08-13 PROCEDURE — 3074F SYST BP LT 130 MM HG: CPT | Performed by: FAMILY MEDICINE

## 2025-08-13 PROCEDURE — 36415 COLL VENOUS BLD VENIPUNCTURE: CPT | Performed by: FAMILY MEDICINE

## 2025-08-13 PROCEDURE — 1126F AMNT PAIN NOTED NONE PRSNT: CPT | Performed by: FAMILY MEDICINE

## 2025-08-13 PROCEDURE — 3078F DIAST BP <80 MM HG: CPT | Performed by: FAMILY MEDICINE

## 2025-08-13 RX ORDER — LOSARTAN POTASSIUM 50 MG/1
50 TABLET ORAL DAILY
Qty: 90 TABLET | Refills: 3 | Status: SHIPPED | OUTPATIENT
Start: 2025-08-13

## 2025-08-13 ASSESSMENT — PAIN SCALES - GENERAL: PAINLEVEL_OUTOF10: NO PAIN (0)

## 2025-08-14 LAB
ANION GAP SERPL CALCULATED.3IONS-SCNC: 13 MMOL/L (ref 7–15)
BUN SERPL-MCNC: 18.5 MG/DL (ref 8–23)
CALCIUM SERPL-MCNC: 9.2 MG/DL (ref 8.8–10.4)
CHLORIDE SERPL-SCNC: 103 MMOL/L (ref 98–107)
CREAT SERPL-MCNC: 0.86 MG/DL (ref 0.67–1.17)
EGFRCR SERPLBLD CKD-EPI 2021: >90 ML/MIN/1.73M2
GLUCOSE SERPL-MCNC: 103 MG/DL (ref 70–99)
HCO3 SERPL-SCNC: 23 MMOL/L (ref 22–29)
POTASSIUM SERPL-SCNC: 4.5 MMOL/L (ref 3.4–5.3)
SODIUM SERPL-SCNC: 139 MMOL/L (ref 135–145)